# Patient Record
Sex: MALE | Race: WHITE | NOT HISPANIC OR LATINO
[De-identification: names, ages, dates, MRNs, and addresses within clinical notes are randomized per-mention and may not be internally consistent; named-entity substitution may affect disease eponyms.]

---

## 2017-07-03 ENCOUNTER — APPOINTMENT (OUTPATIENT)
Dept: MRI IMAGING | Facility: CLINIC | Age: 72
End: 2017-07-03

## 2017-07-03 ENCOUNTER — OUTPATIENT (OUTPATIENT)
Dept: OUTPATIENT SERVICES | Facility: HOSPITAL | Age: 72
LOS: 1 days | End: 2017-07-03
Payer: COMMERCIAL

## 2017-07-03 DIAGNOSIS — Z00.8 ENCOUNTER FOR OTHER GENERAL EXAMINATION: ICD-10-CM

## 2017-07-03 DIAGNOSIS — M54.16 RADICULOPATHY, LUMBAR REGION: ICD-10-CM

## 2017-07-03 PROCEDURE — 72148 MRI LUMBAR SPINE W/O DYE: CPT

## 2019-05-01 ENCOUNTER — APPOINTMENT (OUTPATIENT)
Dept: UROLOGY | Facility: CLINIC | Age: 74
End: 2019-05-01
Payer: MEDICARE

## 2019-05-01 VITALS
BODY MASS INDEX: 30.31 KG/M2 | HEIGHT: 68 IN | HEART RATE: 62 BPM | OXYGEN SATURATION: 95 % | WEIGHT: 200 LBS | SYSTOLIC BLOOD PRESSURE: 124 MMHG | DIASTOLIC BLOOD PRESSURE: 76 MMHG | TEMPERATURE: 98 F

## 2019-05-01 DIAGNOSIS — Z80.1 FAMILY HISTORY OF MALIGNANT NEOPLASM OF TRACHEA, BRONCHUS AND LUNG: ICD-10-CM

## 2019-05-01 DIAGNOSIS — Z80.8 FAMILY HISTORY OF MALIGNANT NEOPLASM OF OTHER ORGANS OR SYSTEMS: ICD-10-CM

## 2019-05-01 DIAGNOSIS — Z78.9 OTHER SPECIFIED HEALTH STATUS: ICD-10-CM

## 2019-05-01 PROCEDURE — 99204 OFFICE O/P NEW MOD 45 MIN: CPT

## 2019-05-05 PROBLEM — Z80.1 FAMILY HISTORY OF LUNG CANCER: Status: ACTIVE | Noted: 2019-05-05

## 2019-05-05 PROBLEM — Z80.8 FAMILY HISTORY OF MALIGNANT NEOPLASM OF BONE: Status: ACTIVE | Noted: 2019-05-05

## 2019-05-05 PROBLEM — Z78.9 SOCIAL ALCOHOL USE: Status: ACTIVE | Noted: 2019-05-05

## 2019-05-05 NOTE — PHYSICAL EXAM
[General Appearance - Well Developed] : well developed [General Appearance - Well Nourished] : well nourished [Well Groomed] : well groomed [Normal Appearance] : normal appearance [Edema] : no peripheral edema [General Appearance - In No Acute Distress] : no acute distress [Respiration, Rhythm And Depth] : normal respiratory rhythm and effort [Costovertebral Angle Tenderness] : no ~M costovertebral angle tenderness [Abdomen Tenderness] : non-tender [Abdomen Soft] : soft [Exaggerated Use Of Accessory Muscles For Inspiration] : no accessory muscle use [Penis Abnormality] : normal circumcised penis [Urethral Meatus] : meatus normal [Urinary Bladder Findings] : the bladder was normal on palpation [Scrotum] : the scrotum was normal [Testes Mass (___cm)] : there were no testicular masses [No Prostate Nodules] : no prostate nodules [] : no rash [Prostate Size ___ gm] : prostate size [unfilled] gm [Normal Station and Gait] : the gait and station were normal for the patient's age [No Focal Deficits] : no focal deficits [Oriented To Time, Place, And Person] : oriented to person, place, and time [Affect] : the affect was normal [Mood] : the mood was normal [Not Anxious] : not anxious [No Palpable Adenopathy] : no palpable adenopathy

## 2019-05-05 NOTE — HISTORY OF PRESENT ILLNESS
[FreeTextEntry1] : Presents to office for evaluation.\par BPH: currently on dutasteride and tamsulosin. Has been on medication for several years.\par In the last 12 months, increasing symptoms including weak stream, straining void, urgency, nocturia 6-7 times.  Complaints of retrograde ejaculation secondary to medication as well.  Denies hematuria, recurrent UTI.  Has occasional urge incontinence.  No prior  surgery.  No h/o kidney stones.  \par \par Symptoms are bothersome.\par ROS otherwise unremarkable.

## 2019-05-05 NOTE — ASSESSMENT
[FreeTextEntry1] : BPH: continue finasteride and tamsulosin.\par Urinary frequency: start oxybutynin ER 10 mg once daily.\par Goals of medication reviewed.  Discussed the potential adverse effects of the medication.  Discussed the proper administration of the medication.\par \par Follow up in 6 weeks at [place] office for cystoscopy to evaluate bladder outlet.  If significant obstruction, then may need to consider outlet reducing procedure.\par \par All questions answered.

## 2019-06-11 ENCOUNTER — APPOINTMENT (OUTPATIENT)
Dept: UROLOGY | Facility: CLINIC | Age: 74
End: 2019-06-11
Payer: MEDICARE

## 2019-06-11 ENCOUNTER — OUTPATIENT (OUTPATIENT)
Dept: OUTPATIENT SERVICES | Facility: HOSPITAL | Age: 74
LOS: 1 days | End: 2019-06-11
Payer: COMMERCIAL

## 2019-06-11 DIAGNOSIS — R35.0 FREQUENCY OF MICTURITION: ICD-10-CM

## 2019-06-11 PROCEDURE — 52000 CYSTOURETHROSCOPY: CPT

## 2019-06-26 DIAGNOSIS — N40.1 BENIGN PROSTATIC HYPERPLASIA WITH LOWER URINARY TRACT SYMPTOMS: ICD-10-CM

## 2019-07-23 ENCOUNTER — APPOINTMENT (OUTPATIENT)
Dept: UROLOGY | Facility: CLINIC | Age: 74
End: 2019-07-23
Payer: MEDICARE

## 2019-07-23 PROCEDURE — 99213 OFFICE O/P EST LOW 20 MIN: CPT

## 2019-07-23 RX ORDER — DUTASTERIDE 0.5 MG/1
0.5 CAPSULE, LIQUID FILLED ORAL
Refills: 0 | Status: COMPLETED | COMMUNITY
End: 2019-07-23

## 2019-07-23 RX ORDER — TAMSULOSIN HYDROCHLORIDE 0.4 MG/1
0.4 CAPSULE ORAL
Refills: 0 | Status: COMPLETED | COMMUNITY
End: 2019-07-23

## 2019-10-29 ENCOUNTER — APPOINTMENT (OUTPATIENT)
Dept: UROLOGY | Facility: CLINIC | Age: 74
End: 2019-10-29
Payer: MEDICARE

## 2019-10-29 VITALS — SYSTOLIC BLOOD PRESSURE: 126 MMHG | DIASTOLIC BLOOD PRESSURE: 77 MMHG | HEART RATE: 56 BPM | TEMPERATURE: 98 F

## 2019-10-29 PROCEDURE — 99213 OFFICE O/P EST LOW 20 MIN: CPT

## 2019-10-29 NOTE — HISTORY OF PRESENT ILLNESS
[FreeTextEntry1] : Here for 3-month follow-up visit.  He has BPH and lower urinary tract symptoms.  Currently on alfuzocin and oxybutynin ER.  His dose was increased to 15 mg once a day and he noticed an improvement in his urinary frequency.  Nocturia now 2 times per night.  Urinary flow is good.  He no hematuria or dysuria.  No abdominal pain or flank pain.  He denies urge incontinence.\par \par Tolerating medication well without significant side effects.

## 2019-10-29 NOTE — ASSESSMENT
[FreeTextEntry1] : Recent PSA 0.25 ng/mL\par Continue current medications and follow-up in 6 months.

## 2020-04-07 ENCOUNTER — APPOINTMENT (OUTPATIENT)
Dept: UROLOGY | Facility: CLINIC | Age: 75
End: 2020-04-07

## 2021-12-16 ENCOUNTER — APPOINTMENT (OUTPATIENT)
Dept: GASTROENTEROLOGY | Facility: CLINIC | Age: 76
End: 2021-12-16
Payer: MEDICARE

## 2021-12-16 VITALS
WEIGHT: 192 LBS | BODY MASS INDEX: 29.1 KG/M2 | DIASTOLIC BLOOD PRESSURE: 90 MMHG | TEMPERATURE: 98 F | HEIGHT: 68 IN | SYSTOLIC BLOOD PRESSURE: 138 MMHG

## 2021-12-16 DIAGNOSIS — Z12.11 ENCOUNTER FOR SCREENING FOR MALIGNANT NEOPLASM OF COLON: ICD-10-CM

## 2021-12-16 PROCEDURE — 99204 OFFICE O/P NEW MOD 45 MIN: CPT

## 2021-12-16 NOTE — CONSULT LETTER
[Dear  ___] : Dear  [unfilled], [Consult Letter:] : I had the pleasure of evaluating your patient, [unfilled]. [Please see my note below.] : Please see my note below. [Consult Closing:] : Thank you very much for allowing me to participate in the care of this patient.  If you have any questions, please do not hesitate to contact me. [Sincerely,] : Sincerely, [FreeTextEntry2] : Moe Augustine MD\par \par [address] [facility]\par [facility] [facility] \par \par  [FreeTextEntry3] : Michael June MD\par

## 2021-12-16 NOTE — ASSESSMENT
[FreeTextEntry1] : Impression\par \par Heartburn\par \par Recently placed on Pepcid\par \par Chronic renal insufficiency and taken off pantoprazole\par \par Request for colon cancer screening\par \par Suggest\par \par Agree with colonoscopy and upper endoscopy\par \par Nulytely\par \par The laxative, or its risks benefits and alternatives have been thoroughly reviewed with the patient in great detail. The laxative instructions have been reviewed in great detail with the patient.\par \par Risks/benefits:\par The procedure, the risks and benefits and alternatives have been reviewed in great detail with the patient.  Risks including, but not limited to sedation such as cardiac and pulmonary compromise, the procedure itself such as bleeding requiring hospitalization, transfusion, surgery, temporary or permanent colostomy.  Perforation or puncture of the requiring hospitalization, surgery, temporary colostomy.\par It has been explained to the patient that though colonoscopy is thought to be the best screening exam for colon cancer and polyps, no screening exam can find all colon polyps or cancers.  \par The patient expresses understanding of the procedure and consents to undergoing the procedure.\par

## 2021-12-16 NOTE — PHYSICAL EXAM
[General Appearance - Alert] : alert [General Appearance - In No Acute Distress] : in no acute distress [General Appearance - Well Developed] : well developed [General Appearance - Well Nourished] : well nourished [Sclera] : the sclera and conjunctiva were normal [Neck Appearance] : the appearance of the neck was normal [Neck Cervical Mass (___cm)] : no neck mass was observed [Jugular Venous Distention Increased] : there was no jugular-venous distention [Auscultation Breath Sounds / Voice Sounds] : lungs were clear to auscultation bilaterally [Apical Impulse] : the apical impulse was normal [Full Pulse] : the pedal pulses are present [Edema] : there was no peripheral edema [Bowel Sounds] : normal bowel sounds [Abdomen Soft] : soft [Abdomen Tenderness] : non-tender [] : no hepato-splenomegaly [Abdomen Mass (___ Cm)] : no abdominal mass palpated [Occult Blood Positive] : stool was negative for occult blood [Cervical Lymph Nodes Enlarged Posterior Bilaterally] : posterior cervical [Cervical Lymph Nodes Enlarged Anterior Bilaterally] : anterior cervical [Supraclavicular Lymph Nodes Enlarged Bilaterally] : supraclavicular [Axillary Lymph Nodes Enlarged Bilaterally] : axillary [Femoral Lymph Nodes Enlarged Bilaterally] : femoral [Inguinal Lymph Nodes Enlarged Bilaterally] : inguinal [No CVA Tenderness] : no ~M costovertebral angle tenderness [No Spinal Tenderness] : no spinal tenderness [Abnormal Walk] : normal gait [Nail Clubbing] : no clubbing  or cyanosis of the fingernails [Musculoskeletal - Swelling] : no joint swelling seen [Motor Tone] : muscle strength and tone were normal [Skin Color & Pigmentation] : normal skin color and pigmentation [Skin Turgor] : normal skin turgor [No Focal Deficits] : no focal deficits [Oriented To Time, Place, And Person] : oriented to person, place, and time [Impaired Insight] : insight and judgment were intact [Affect] : the affect was normal

## 2021-12-16 NOTE — HISTORY OF PRESENT ILLNESS
[de-identified] : Moe Augustine MD\par \par [address] [facility]\[place] [address] \par \par 76-year-old gentleman\par \par Chronic gastroesophageal reflux disease\par \par He had been on pantoprazole a number of years\par \par He has had some decreased renal function\par \par He was taken off pantoprazole\par \par He was placed on Pepcid\par \par He took his first Pepcid today\par \par There is no heartburn or dysphagia so far\par \par No odynophagia\par \par His appetite is good and is not losing any weight and he has no abdominal pain\par \par He is due for screening colonoscopy\par \par There is no change in bowel habits or blood per rectum\par \par He has had a polyp in the past

## 2021-12-16 NOTE — REASON FOR VISIT
[Initial Evaluation] : an initial evaluation [FreeTextEntry1] : GERD, chronic renal insufficiency, taken off pantoprazole and placed on Pepcid, has an upper endoscopy, needs colonoscopy, history of colon polyp

## 2021-12-17 ENCOUNTER — APPOINTMENT (OUTPATIENT)
Dept: GASTROENTEROLOGY | Facility: CLINIC | Age: 76
End: 2021-12-17

## 2022-02-03 LAB — SARS-COV-2 N GENE NPH QL NAA+PROBE: NOT DETECTED

## 2022-02-07 ENCOUNTER — APPOINTMENT (OUTPATIENT)
Dept: GASTROENTEROLOGY | Facility: AMBULATORY MEDICAL SERVICES | Age: 77
End: 2022-02-07
Payer: MEDICARE

## 2022-02-07 PROCEDURE — 43239 EGD BIOPSY SINGLE/MULTIPLE: CPT

## 2022-02-07 PROCEDURE — 45380 COLONOSCOPY AND BIOPSY: CPT

## 2022-02-07 RX ORDER — PANTOPRAZOLE 20 MG/1
20 TABLET, DELAYED RELEASE ORAL
Refills: 0 | Status: DISCONTINUED | COMMUNITY
End: 2022-02-07

## 2022-02-07 RX ORDER — POLYETHYLENE GLYCOL 3350, SODIUM CHLORIDE, SODIUM BICARBONATE AND POTASSIUM CHLORIDE WITH LEMON FLAVOR 420; 11.2; 5.72; 1.48 G/4L; G/4L; G/4L; G/4L
420 POWDER, FOR SOLUTION ORAL
Qty: 1 | Refills: 0 | Status: DISCONTINUED | COMMUNITY
Start: 2021-12-16 | End: 2022-02-07

## 2022-02-15 ENCOUNTER — NON-APPOINTMENT (OUTPATIENT)
Age: 77
End: 2022-02-15

## 2022-05-10 LAB — SARS-COV-2 N GENE NPH QL NAA+PROBE: NOT DETECTED

## 2022-05-12 ENCOUNTER — APPOINTMENT (OUTPATIENT)
Dept: GASTROENTEROLOGY | Facility: AMBULATORY MEDICAL SERVICES | Age: 77
End: 2022-05-12
Payer: MEDICARE

## 2022-05-12 PROCEDURE — 43239 EGD BIOPSY SINGLE/MULTIPLE: CPT

## 2022-05-18 LAB — GASTRIN SERPL-MCNC: 27 PG/ML

## 2022-05-20 ENCOUNTER — NON-APPOINTMENT (OUTPATIENT)
Age: 77
End: 2022-05-20

## 2022-05-20 LAB
GASTRIN SERPL-MCNC: 19 PG/ML
GASTRIN SERPL-MCNC: 28 PG/ML

## 2022-05-24 ENCOUNTER — NON-APPOINTMENT (OUTPATIENT)
Age: 77
End: 2022-05-24

## 2022-08-10 ENCOUNTER — RX RENEWAL (OUTPATIENT)
Age: 77
End: 2022-08-10

## 2022-08-10 ENCOUNTER — NON-APPOINTMENT (OUTPATIENT)
Age: 77
End: 2022-08-10

## 2022-08-20 LAB — SARS-COV-2 N GENE NPH QL NAA+PROBE: NOT DETECTED

## 2022-08-24 ENCOUNTER — APPOINTMENT (OUTPATIENT)
Dept: GASTROENTEROLOGY | Facility: AMBULATORY MEDICAL SERVICES | Age: 77
End: 2022-08-24

## 2022-08-24 PROCEDURE — 43239 EGD BIOPSY SINGLE/MULTIPLE: CPT

## 2022-08-29 ENCOUNTER — NON-APPOINTMENT (OUTPATIENT)
Age: 77
End: 2022-08-29

## 2022-11-09 ENCOUNTER — APPOINTMENT (OUTPATIENT)
Dept: INTERNAL MEDICINE | Facility: CLINIC | Age: 77
End: 2022-11-09

## 2022-11-09 VITALS
SYSTOLIC BLOOD PRESSURE: 130 MMHG | DIASTOLIC BLOOD PRESSURE: 78 MMHG | OXYGEN SATURATION: 97 % | BODY MASS INDEX: 30.01 KG/M2 | HEART RATE: 73 BPM | HEIGHT: 68 IN | TEMPERATURE: 98 F | WEIGHT: 198 LBS

## 2022-11-09 DIAGNOSIS — K29.00 ACUTE GASTRITIS W/OUT BLEEDING: ICD-10-CM

## 2022-11-09 DIAGNOSIS — R41.3 OTHER AMNESIA: ICD-10-CM

## 2022-11-09 DIAGNOSIS — Z87.898 PERSONAL HISTORY OF OTHER SPECIFIED CONDITIONS: ICD-10-CM

## 2022-11-09 DIAGNOSIS — Z01.812 ENCOUNTER FOR PREPROCEDURAL LABORATORY EXAMINATION: ICD-10-CM

## 2022-11-09 DIAGNOSIS — Z20.822 ENCOUNTER FOR PREPROCEDURAL LABORATORY EXAMINATION: ICD-10-CM

## 2022-11-09 PROCEDURE — G0439: CPT

## 2022-11-09 RX ORDER — METOPROLOL SUCCINATE 25 MG/1
25 TABLET, EXTENDED RELEASE ORAL
Refills: 0 | Status: DISCONTINUED | COMMUNITY
End: 2022-11-09

## 2022-11-09 RX ORDER — OXYBUTYNIN CHLORIDE 15 MG/1
15 TABLET, EXTENDED RELEASE ORAL DAILY
Qty: 90 | Refills: 2 | Status: DISCONTINUED | COMMUNITY
Start: 2019-05-01 | End: 2022-11-09

## 2022-11-09 RX ORDER — ALFUZOSIN HYDROCHLORIDE 10 MG/1
10 TABLET, EXTENDED RELEASE ORAL
Qty: 180 | Refills: 3 | Status: DISCONTINUED | COMMUNITY
Start: 2019-06-11 | End: 2022-11-09

## 2022-12-04 NOTE — HEALTH RISK ASSESSMENT
[Never] : Never [Yes] : Yes [No] : In the past 12 months have you used drugs other than those required for medical reasons? No [No falls in past year] : Patient reported no falls in the past year [0] : 2) Feeling down, depressed, or hopeless: Not at all (0) [PHQ-2 Negative - No further assessment needed] : PHQ-2 Negative - No further assessment needed [[id number]] : 0 [Patient reported colonoscopy was normal] : Patient reported colonoscopy was normal [Change in mental status noted] : No change in mental status noted [None] : None [Alone] : lives alone [Retired] : retired [Single] : single [] :  [Sexually Active] : sexually active [High Risk Behavior] : no high risk behavior [Feels Safe at Home] : Feels safe at home [Fully functional (bathing, dressing, toileting, transferring, walking, feeding)] : Fully functional (bathing, dressing, toileting, transferring, walking, feeding) [Fully functional (using the telephone, shopping, preparing meals, housekeeping, doing laundry, using] : Fully functional and needs no help or supervision to perform IADLs (using the telephone, shopping, preparing meals, housekeeping, doing laundry, using transportation, managing medications and managing finances) [Reports changes in hearing] : Reports no changes in hearing [Reports changes in vision] : Reports no changes in vision [Reports changes in dental health] : Reports no changes in dental health [ColonoscopyDate] : 02/2022

## 2022-12-04 NOTE — PHYSICAL EXAM
[No Acute Distress] : no acute distress [Well Nourished] : well nourished [Well Developed] : well developed [Well-Appearing] : well-appearing [Normal Sclera/Conjunctiva] : normal sclera/conjunctiva [PERRL] : pupils equal round and reactive to light [EOMI] : extraocular movements intact [Normal Outer Ear/Nose] : the outer ears and nose were normal in appearance [Normal Oropharynx] : the oropharynx was normal [Normal TMs] : both tympanic membranes were normal [Normal Nasal Mucosa] : the nasal mucosa was normal [No JVD] : no jugular venous distention [No Lymphadenopathy] : no lymphadenopathy [Supple] : supple [Thyroid Normal, No Nodules] : the thyroid was normal and there were no nodules present [No Respiratory Distress] : no respiratory distress  [No Accessory Muscle Use] : no accessory muscle use [Clear to Auscultation] : lungs were clear to auscultation bilaterally [Normal Rate] : normal rate  [Regular Rhythm] : with a regular rhythm [Normal S1, S2] : normal S1 and S2 [No Murmur] : no murmur heard [No Carotid Bruits] : no carotid bruits [No Abdominal Bruit] : a ~M bruit was not heard ~T in the abdomen [No Varicosities] : no varicosities [Pedal Pulses Present] : the pedal pulses are present [No Palpable Aorta] : no palpable aorta [No Extremity Clubbing/Cyanosis] : no extremity clubbing/cyanosis [Soft] : abdomen soft [Non Tender] : non-tender [Non-distended] : non-distended [No Masses] : no abdominal mass palpated [No HSM] : no HSM [Normal Bowel Sounds] : normal bowel sounds [Normal Posterior Cervical Nodes] : no posterior cervical lymphadenopathy [Normal Anterior Cervical Nodes] : no anterior cervical lymphadenopathy [No CVA Tenderness] : no CVA  tenderness [No Spinal Tenderness] : no spinal tenderness [No Joint Swelling] : no joint swelling [Grossly Normal Strength/Tone] : grossly normal strength/tone [No Rash] : no rash [Coordination Grossly Intact] : coordination grossly intact [No Focal Deficits] : no focal deficits [Normal Gait] : normal gait [Deep Tendon Reflexes (DTR)] : deep tendon reflexes were 2+ and symmetric [Speech Grossly Normal] : speech grossly normal [Memory Grossly Normal] : memory grossly normal [Normal Affect] : the affect was normal [Alert and Oriented x3] : oriented to person, place, and time [Normal Mood] : the mood was normal [Normal Insight/Judgement] : insight and judgment were intact [de-identified] : +b/L trace edema

## 2022-12-04 NOTE — HISTORY OF PRESENT ILLNESS
[de-identified] : 76 y/o male patient is new to the office presents today to establish care with new PCP/AWV:\par - HCM: last colonoscopy 02/2022 \par - f/u HTN/peripheral edema/CRI/obesity - BP well controlled, denies any CP/SOB/Palpitations/edema, compliant with medications, following with nephrologist, labs done yesterday will have records sent to office

## 2022-12-09 ENCOUNTER — APPOINTMENT (OUTPATIENT)
Dept: GASTROENTEROLOGY | Facility: CLINIC | Age: 77
End: 2022-12-09

## 2022-12-09 VITALS
HEART RATE: 65 BPM | OXYGEN SATURATION: 97 % | BODY MASS INDEX: 29.89 KG/M2 | HEIGHT: 68 IN | WEIGHT: 197.2 LBS | TEMPERATURE: 97.7 F | SYSTOLIC BLOOD PRESSURE: 120 MMHG | DIASTOLIC BLOOD PRESSURE: 72 MMHG

## 2022-12-09 DIAGNOSIS — K29.50 UNSPECIFIED CHRONIC GASTRITIS W/OUT BLEEDING: ICD-10-CM

## 2022-12-09 DIAGNOSIS — Z86.010 PERSONAL HISTORY OF COLONIC POLYPS: ICD-10-CM

## 2022-12-09 PROCEDURE — 99213 OFFICE O/P EST LOW 20 MIN: CPT

## 2022-12-09 NOTE — HISTORY OF PRESENT ILLNESS
[FreeTextEntry1] : Dr. Rosado takes care this pleasant 77-year-old gentleman\par \par Previously had GERD symptoms\par \par Upper endoscopy initially showed severe gastritis with a linear erosion with negative biopsies\par \par Repeat showed again severe gastritis\par \par Gastrin levels were normal\par \par Most recent upper endoscopy on Carafate and famotidine much improved gastritis with again negative biopsies\par \par No H. pylori\par \par Appetite is good\par \par

## 2022-12-09 NOTE — PHYSICAL EXAM
[Alert] : alert [Normal Voice/Communication] : normal voice/communication [Healthy Appearing] : healthy appearing [No Acute Distress] : no acute distress [Sclera] : the sclera and conjunctiva were normal [Hearing Threshold Finger Rub Not [place]] : hearing was normal [Normal Appearance] : the appearance of the neck was normal [No Respiratory Distress] : no respiratory distress [No Acc Muscle Use] : no accessory muscle use [Respiration, Rhythm And Depth] : normal respiratory rhythm and effort [Heart Rate And Rhythm] : heart rate was normal and rhythm regular [None] : no edema [Bowel Sounds] : normal bowel sounds [Abdomen Tenderness] : non-tender [No Masses] : no abdominal mass palpated [Abdomen Soft] : soft [] : no hepatosplenomegaly [Cervical Lymph Nodes Enlarged Posterior Bilaterally] : no posterior cervical lymphadenopathy [No CVA Tenderness] : no CVA  tenderness [No Spinal Tenderness] : no spinal tenderness [Abnormal Walk] : normal gait [Normal Color / Pigmentation] : normal skin color and pigmentation [No Focal Deficits] : no focal deficits [Oriented To Time, Place, And Person] : oriented to person, place, and time [de-identified] : Pleasant heavyset gentleman, no acute distress

## 2022-12-09 NOTE — ASSESSMENT
[FreeTextEntry1] : Impression\par \par GERD\par \par Previous severe gastritis\par \par Most recent upper endoscopy mild gastritis\par \par All negative biopsies\par \par Gastrin levels normal\par \par Chronic renal insufficiency, nephrologist is okay with him using Pepcid, not pantoprazole\par \par Suggest\par \par Stop Carafate\par \par Continue Pepcid 40 mg a day and even tried every other day\par \par Antireflux diet and weight loss\par \par Follow-up in 1 year\par \par Call or return anytime sooner as needed

## 2022-12-09 NOTE — REASON FOR VISIT
[Follow-up] : a follow-up of an existing diagnosis [FreeTextEntry1] : GERD, gastritis, negative biopsies, normal gastrin levels, doing well

## 2022-12-17 ENCOUNTER — RX RENEWAL (OUTPATIENT)
Age: 77
End: 2022-12-17

## 2023-03-23 ENCOUNTER — NON-APPOINTMENT (OUTPATIENT)
Age: 78
End: 2023-03-23

## 2023-03-23 ENCOUNTER — APPOINTMENT (OUTPATIENT)
Dept: INTERNAL MEDICINE | Facility: CLINIC | Age: 78
End: 2023-03-23
Payer: MEDICARE

## 2023-03-23 VITALS
SYSTOLIC BLOOD PRESSURE: 133 MMHG | TEMPERATURE: 98.2 F | DIASTOLIC BLOOD PRESSURE: 74 MMHG | WEIGHT: 204 LBS | HEIGHT: 68 IN | HEART RATE: 75 BPM | BODY MASS INDEX: 30.92 KG/M2 | OXYGEN SATURATION: 96 %

## 2023-03-23 DIAGNOSIS — K21.9 GASTRO-ESOPHAGEAL REFLUX DISEASE W/OUT ESOPHAGITIS: ICD-10-CM

## 2023-03-23 PROCEDURE — 99214 OFFICE O/P EST MOD 30 MIN: CPT | Mod: 25

## 2023-03-23 PROCEDURE — 93000 ELECTROCARDIOGRAM COMPLETE: CPT

## 2023-03-23 RX ORDER — SUCRALFATE 1 G/1
1 TABLET ORAL
Qty: 120 | Refills: 3 | Status: DISCONTINUED | COMMUNITY
Start: 2022-02-07 | End: 2023-03-23

## 2023-03-23 NOTE — HISTORY OF PRESENT ILLNESS
[No Pertinent Cardiac History] : no history of aortic stenosis, atrial fibrillation, coronary artery disease, recent myocardial infarction, or implantable device/pacemaker [No Pertinent Pulmonary History] : no history of asthma, COPD, sleep apnea, or smoking [No Adverse Anesthesia Reaction] : no adverse anesthesia reaction in self or family member [(Patient denies any chest pain, claudication, dyspnea on exertion, orthopnea, palpitations or syncope)] : Patient denies any chest pain, claudication, dyspnea on exertion, orthopnea, palpitations or syncope [Chronic Anticoagulation] : no chronic anticoagulation [Chronic Kidney Disease] : no chronic kidney disease [Diabetes] : no diabetes [FreeTextEntry1] : 4/12/23 [FreeTextEntry2] : 4/12/23 [FreeTextEntry3] : Dr. Silas Christianson [FreeTextEntry4] : 77 year old male here for clearance for cataract surgery for left eye.

## 2023-03-23 NOTE — ASSESSMENT
[Patient Optimized for Surgery] : Patient optimized for surgery [No Further Testing Recommended] : no further testing recommended [Modify medications prior to procedure] : Modify medications prior to procedure [FreeTextEntry7] : discuss about holding Flomax

## 2023-05-08 ENCOUNTER — APPOINTMENT (OUTPATIENT)
Dept: INTERNAL MEDICINE | Facility: CLINIC | Age: 78
End: 2023-05-08
Payer: MEDICARE

## 2023-05-08 ENCOUNTER — LABORATORY RESULT (OUTPATIENT)
Age: 78
End: 2023-05-08

## 2023-05-08 VITALS
HEIGHT: 68 IN | WEIGHT: 200 LBS | BODY MASS INDEX: 30.31 KG/M2 | HEART RATE: 71 BPM | TEMPERATURE: 98 F | DIASTOLIC BLOOD PRESSURE: 82 MMHG | SYSTOLIC BLOOD PRESSURE: 142 MMHG | OXYGEN SATURATION: 96 %

## 2023-05-08 DIAGNOSIS — H26.9 UNSPECIFIED CATARACT: ICD-10-CM

## 2023-05-08 DIAGNOSIS — N18.9 CHRONIC KIDNEY DISEASE, UNSPECIFIED: ICD-10-CM

## 2023-05-08 DIAGNOSIS — Z01.818 ENCOUNTER FOR OTHER PREPROCEDURAL EXAMINATION: ICD-10-CM

## 2023-05-08 PROCEDURE — 36415 COLL VENOUS BLD VENIPUNCTURE: CPT

## 2023-05-08 PROCEDURE — 99214 OFFICE O/P EST MOD 30 MIN: CPT | Mod: 25

## 2023-05-08 RX ORDER — FAMOTIDINE 40 MG/1
40 TABLET, FILM COATED ORAL DAILY
Qty: 30 | Refills: 5 | Status: DISCONTINUED | OUTPATIENT
Start: 2022-02-07 | End: 2023-05-08

## 2023-05-08 RX ORDER — CALCITRIOL 0.5 UG/1
0.5 CAPSULE, LIQUID FILLED ORAL
Refills: 0 | Status: ACTIVE | COMMUNITY

## 2023-05-08 NOTE — HISTORY OF PRESENT ILLNESS
[de-identified] : 78 y/o male patient presents today:\par - f/u HTN/CKD stage 3/Peripheral edema/obesity - following with nephrologist, recently labs done 4/13 reviewed with patient today, CKD stable, no recent changes to medications \par - c/o productive cough, SOB, wheezing, nasal congestion, sore throat, x 1 week, had Covid test done 5/3 negative, has been taking Mucinex D and Afrin with minimal relief

## 2023-05-08 NOTE — PHYSICAL EXAM
[Normal Oropharynx] : the oropharynx was normal [Normal] : no rash [de-identified] :  hypertrophied turbinates, clear rhinorrhea, PND, no exudates [de-identified] : +mild exp wheezing B/L, scattered rhonchi  [de-identified] : +trace peripheral edema

## 2023-05-08 NOTE — REVIEW OF SYSTEMS
[Fever] : no fever [Chills] : no chills [Fatigue] : fatigue [Earache] : no earache [Nosebleeds] : no nosebleeds [Postnasal Drip] : postnasal drip [Nasal Discharge] : nasal discharge [Sore Throat] : sore throat [Hoarseness] : no hoarseness [Shortness Of Breath] : shortness of breath [Wheezing] : wheezing [Cough] : cough [Dyspnea on Exertion] : not dyspnea on exertion [Negative] : Psychiatric

## 2023-06-01 ENCOUNTER — APPOINTMENT (OUTPATIENT)
Dept: INTERNAL MEDICINE | Facility: CLINIC | Age: 78
End: 2023-06-01
Payer: MEDICARE

## 2023-06-01 ENCOUNTER — APPOINTMENT (OUTPATIENT)
Dept: RADIOLOGY | Facility: CLINIC | Age: 78
End: 2023-06-01
Payer: MEDICARE

## 2023-06-01 VITALS
HEIGHT: 68 IN | HEART RATE: 62 BPM | BODY MASS INDEX: 30.31 KG/M2 | SYSTOLIC BLOOD PRESSURE: 144 MMHG | TEMPERATURE: 98.1 F | OXYGEN SATURATION: 95 % | WEIGHT: 200 LBS | DIASTOLIC BLOOD PRESSURE: 81 MMHG

## 2023-06-01 PROCEDURE — 99214 OFFICE O/P EST MOD 30 MIN: CPT

## 2023-06-01 PROCEDURE — 71046 X-RAY EXAM CHEST 2 VIEWS: CPT

## 2023-06-01 NOTE — PHYSICAL EXAM
[No Acute Distress] : no acute distress [Well Nourished] : well nourished [Well Developed] : well developed [Well-Appearing] : well-appearing [Normal Sclera/Conjunctiva] : normal sclera/conjunctiva [PERRL] : pupils equal round and reactive to light [EOMI] : extraocular movements intact [Normal Outer Ear/Nose] : the outer ears and nose were normal in appearance [Normal Oropharynx] : the oropharynx was normal [No JVD] : no jugular venous distention [No Lymphadenopathy] : no lymphadenopathy [Supple] : supple [Thyroid Normal, No Nodules] : the thyroid was normal and there were no nodules present [No Respiratory Distress] : no respiratory distress  [No Accessory Muscle Use] : no accessory muscle use [Normal Rate] : normal rate  [Regular Rhythm] : with a regular rhythm [Normal S1, S2] : normal S1 and S2 [No Murmur] : no murmur heard [No Carotid Bruits] : no carotid bruits [No Abdominal Bruit] : a ~M bruit was not heard ~T in the abdomen [No Varicosities] : no varicosities [Pedal Pulses Present] : the pedal pulses are present [No Edema] : there was no peripheral edema [No Palpable Aorta] : no palpable aorta [No Extremity Clubbing/Cyanosis] : no extremity clubbing/cyanosis [Soft] : abdomen soft [Non Tender] : non-tender [Non-distended] : non-distended [No Masses] : no abdominal mass palpated [No HSM] : no HSM [Normal Bowel Sounds] : normal bowel sounds [Normal Posterior Cervical Nodes] : no posterior cervical lymphadenopathy [Normal Anterior Cervical Nodes] : no anterior cervical lymphadenopathy [No CVA Tenderness] : no CVA  tenderness [No Spinal Tenderness] : no spinal tenderness [No Joint Swelling] : no joint swelling [Grossly Normal Strength/Tone] : grossly normal strength/tone [No Rash] : no rash [Coordination Grossly Intact] : coordination grossly intact [No Focal Deficits] : no focal deficits [Normal Gait] : normal gait [Deep Tendon Reflexes (DTR)] : deep tendon reflexes were 2+ and symmetric [Normal Affect] : the affect was normal [Normal Insight/Judgement] : insight and judgment were intact [de-identified] : b/l wheezing

## 2023-07-05 ENCOUNTER — RX RENEWAL (OUTPATIENT)
Age: 78
End: 2023-07-05

## 2023-07-11 ENCOUNTER — APPOINTMENT (OUTPATIENT)
Dept: INTERNAL MEDICINE | Facility: CLINIC | Age: 78
End: 2023-07-11
Payer: MEDICARE

## 2023-07-11 VITALS
WEIGHT: 200 LBS | HEIGHT: 68 IN | SYSTOLIC BLOOD PRESSURE: 152 MMHG | TEMPERATURE: 98.1 F | DIASTOLIC BLOOD PRESSURE: 81 MMHG | BODY MASS INDEX: 30.31 KG/M2 | OXYGEN SATURATION: 95 % | HEART RATE: 79 BPM

## 2023-07-11 DIAGNOSIS — J20.9 ACUTE BRONCHITIS, UNSPECIFIED: ICD-10-CM

## 2023-07-11 PROCEDURE — 99214 OFFICE O/P EST MOD 30 MIN: CPT

## 2023-07-11 NOTE — HISTORY OF PRESENT ILLNESS
[FreeTextEntry8] : 77 year old male here for cough, chest congestion 4-5 days. Patient states wheezing, worse in the morning. no fever no chills. Patient states worse when laying down, productive cough. covid negative.

## 2023-07-11 NOTE — PHYSICAL EXAM
[No Acute Distress] : no acute distress [Well Nourished] : well nourished [Well Developed] : well developed [Well-Appearing] : well-appearing [Normal Sclera/Conjunctiva] : normal sclera/conjunctiva [PERRL] : pupils equal round and reactive to light [EOMI] : extraocular movements intact [Normal Outer Ear/Nose] : the outer ears and nose were normal in appearance [Normal Oropharynx] : the oropharynx was normal [No JVD] : no jugular venous distention [No Lymphadenopathy] : no lymphadenopathy [Supple] : supple [Thyroid Normal, No Nodules] : the thyroid was normal and there were no nodules present [No Respiratory Distress] : no respiratory distress  [Clear to Auscultation] : lungs were clear to auscultation bilaterally [Normal Rate] : normal rate  [Regular Rhythm] : with a regular rhythm [Normal S1, S2] : normal S1 and S2 [No Murmur] : no murmur heard [No Carotid Bruits] : no carotid bruits [No Abdominal Bruit] : a ~M bruit was not heard ~T in the abdomen [No Varicosities] : no varicosities [Pedal Pulses Present] : the pedal pulses are present [No Edema] : there was no peripheral edema [No Palpable Aorta] : no palpable aorta [No Extremity Clubbing/Cyanosis] : no extremity clubbing/cyanosis [Soft] : abdomen soft [Non Tender] : non-tender [Non-distended] : non-distended [No Masses] : no abdominal mass palpated [No HSM] : no HSM [Normal Bowel Sounds] : normal bowel sounds [Normal Posterior Cervical Nodes] : no posterior cervical lymphadenopathy [Normal Anterior Cervical Nodes] : no anterior cervical lymphadenopathy [No CVA Tenderness] : no CVA  tenderness [No Spinal Tenderness] : no spinal tenderness [No Joint Swelling] : no joint swelling [Grossly Normal Strength/Tone] : grossly normal strength/tone [No Rash] : no rash [Coordination Grossly Intact] : coordination grossly intact [No Focal Deficits] : no focal deficits [Normal Gait] : normal gait [Deep Tendon Reflexes (DTR)] : deep tendon reflexes were 2+ and symmetric [Normal Affect] : the affect was normal [Normal Insight/Judgement] : insight and judgment were intact

## 2023-09-12 LAB
25(OH)D3 SERPL-MCNC: 53.9 NG/ML
BASOPHILS # BLD AUTO: 0.06 K/UL
BASOPHILS NFR BLD AUTO: 0.8 %
CHOLEST SERPL-MCNC: 205 MG/DL
EOSINOPHIL # BLD AUTO: 0.67 K/UL
EOSINOPHIL NFR BLD AUTO: 8.5 %
ESTIMATED AVERAGE GLUCOSE: 114 MG/DL
HBA1C MFR BLD HPLC: 5.6 %
HCT VFR BLD CALC: 35.6 %
HDLC SERPL-MCNC: 56 MG/DL
HGB BLD-MCNC: 12.6 G/DL
IMM GRANULOCYTES NFR BLD AUTO: 0.9 %
LDLC SERPL CALC-MCNC: 133 MG/DL
LYMPHOCYTES # BLD AUTO: 1.19 K/UL
LYMPHOCYTES NFR BLD AUTO: 15.1 %
MAN DIFF?: NORMAL
MCHC RBC-ENTMCNC: 35.4 GM/DL
MCHC RBC-ENTMCNC: 37.4 PG
MCV RBC AUTO: 105.6 FL
MONOCYTES # BLD AUTO: 0.81 K/UL
MONOCYTES NFR BLD AUTO: 10.3 %
NEUTROPHILS # BLD AUTO: 5.06 K/UL
NEUTROPHILS NFR BLD AUTO: 64.4 %
NONHDLC SERPL-MCNC: 149 MG/DL
PLATELET # BLD AUTO: 255 K/UL
PSA FREE FLD-MCNC: 59 %
PSA FREE SERPL-MCNC: 0.21 NG/ML
PSA SERPL-MCNC: 0.36 NG/ML
RBC # BLD: 3.37 M/UL
RBC # FLD: 14.1 %
TRIGL SERPL-MCNC: 79 MG/DL
VIT B12 SERPL-MCNC: 976 PG/ML
WBC # FLD AUTO: 7.86 K/UL

## 2023-09-12 RX ORDER — AZITHROMYCIN 250 MG/1
250 TABLET, FILM COATED ORAL
Qty: 1 | Refills: 0 | Status: DISCONTINUED | COMMUNITY
Start: 2023-05-08 | End: 2023-09-12

## 2023-09-12 RX ORDER — AMOXICILLIN AND CLAVULANATE POTASSIUM 875; 125 MG/1; MG/1
875-125 TABLET, COATED ORAL
Qty: 20 | Refills: 0 | Status: DISCONTINUED | COMMUNITY
Start: 2023-06-01 | End: 2023-09-12

## 2023-09-27 ENCOUNTER — APPOINTMENT (OUTPATIENT)
Dept: INTERNAL MEDICINE | Facility: CLINIC | Age: 78
End: 2023-09-27
Payer: MEDICARE

## 2023-09-27 VITALS
DIASTOLIC BLOOD PRESSURE: 77 MMHG | HEIGHT: 68 IN | SYSTOLIC BLOOD PRESSURE: 130 MMHG | TEMPERATURE: 98 F | WEIGHT: 202 LBS | HEART RATE: 65 BPM | OXYGEN SATURATION: 96 % | BODY MASS INDEX: 30.62 KG/M2

## 2023-09-27 DIAGNOSIS — J30.89 OTHER ALLERGIC RHINITIS: ICD-10-CM

## 2023-09-27 PROCEDURE — 99214 OFFICE O/P EST MOD 30 MIN: CPT

## 2023-09-27 RX ORDER — PROMETHAZINE HYDROCHLORIDE AND DEXTROMETHORPHAN HYDROBROMIDE ORAL SOLUTION 15; 6.25 MG/5ML; MG/5ML
6.25-15 SOLUTION ORAL
Qty: 300 | Refills: 0 | Status: DISCONTINUED | COMMUNITY
Start: 2023-05-08 | End: 2023-09-27

## 2023-09-27 RX ORDER — PREDNISOLONE ACETATE 10 MG/ML
1 SUSPENSION/ DROPS OPHTHALMIC
Qty: 5 | Refills: 0 | Status: DISCONTINUED | COMMUNITY
Start: 2023-05-04 | End: 2023-09-27

## 2023-09-27 RX ORDER — OFLOXACIN 3 MG/ML
0.3 SOLUTION/ DROPS OPHTHALMIC
Qty: 10 | Refills: 0 | Status: DISCONTINUED | COMMUNITY
Start: 2023-03-29 | End: 2023-09-27

## 2023-09-27 RX ORDER — PREDNISONE 20 MG/1
20 TABLET ORAL
Qty: 30 | Refills: 0 | Status: DISCONTINUED | COMMUNITY
Start: 2023-06-01 | End: 2023-09-27

## 2023-10-10 ENCOUNTER — RX RENEWAL (OUTPATIENT)
Age: 78
End: 2023-10-10

## 2023-10-17 ENCOUNTER — NON-APPOINTMENT (OUTPATIENT)
Age: 78
End: 2023-10-17

## 2023-10-17 ENCOUNTER — APPOINTMENT (OUTPATIENT)
Dept: ALLERGY | Facility: CLINIC | Age: 78
End: 2023-10-17
Payer: MEDICARE

## 2023-10-17 VITALS
SYSTOLIC BLOOD PRESSURE: 136 MMHG | HEIGHT: 68 IN | OXYGEN SATURATION: 95 % | HEART RATE: 70 BPM | DIASTOLIC BLOOD PRESSURE: 72 MMHG | BODY MASS INDEX: 31.52 KG/M2 | WEIGHT: 208 LBS

## 2023-10-17 PROCEDURE — 95004 PERQ TESTS W/ALRGNC XTRCS: CPT

## 2023-10-17 PROCEDURE — 99204 OFFICE O/P NEW MOD 45 MIN: CPT | Mod: 25

## 2023-10-25 ENCOUNTER — APPOINTMENT (OUTPATIENT)
Dept: PULMONOLOGY | Facility: CLINIC | Age: 78
End: 2023-10-25
Payer: MEDICARE

## 2023-10-25 ENCOUNTER — LABORATORY RESULT (OUTPATIENT)
Age: 78
End: 2023-10-25

## 2023-10-25 VITALS
BODY MASS INDEX: 30.46 KG/M2 | WEIGHT: 201 LBS | DIASTOLIC BLOOD PRESSURE: 81 MMHG | HEART RATE: 65 BPM | HEIGHT: 68 IN | SYSTOLIC BLOOD PRESSURE: 145 MMHG | OXYGEN SATURATION: 96 %

## 2023-10-25 PROCEDURE — 99205 OFFICE O/P NEW HI 60 MIN: CPT | Mod: 25

## 2023-10-25 PROCEDURE — 94060 EVALUATION OF WHEEZING: CPT

## 2023-10-25 PROCEDURE — 94729 DIFFUSING CAPACITY: CPT

## 2023-10-25 PROCEDURE — 94727 GAS DIL/WSHOT DETER LNG VOL: CPT

## 2023-10-25 RX ORDER — BUDESONIDE AND FORMOTEROL FUMARATE DIHYDRATE 80; 4.5 UG/1; UG/1
80-4.5 AEROSOL RESPIRATORY (INHALATION)
Qty: 1 | Refills: 2 | Status: DISCONTINUED | COMMUNITY
Start: 2023-09-27 | End: 2023-10-25

## 2023-10-25 RX ORDER — BROMFENAC SODIUM 0.7 MG/ML
0.07 SOLUTION/ DROPS OPHTHALMIC
Qty: 3 | Refills: 0 | Status: DISCONTINUED | COMMUNITY
Start: 2023-03-29 | End: 2023-10-25

## 2023-10-30 LAB
A ALTERNATA IGE QN: <0.1 KUA/L
A FUMIGATUS IGE QN: <0.1 KUA/L
BASOPHILS # BLD AUTO: 0.04 K/UL
BASOPHILS NFR BLD AUTO: 0.4 %
C ALBICANS IGE QN: <0.1 KUA/L
C HERBARUM IGE QN: <0.1 KUA/L
CAT DANDER IGE QN: <0.1 KUA/L
COMMON RAGWEED IGE QN: <0.1 KUA/L
D FARINAE IGE QN: 1.36 KUA/L
D PTERONYSS IGE QN: 0.77 KUA/L
DEPRECATED A ALTERNATA IGE RAST QL: 0
DEPRECATED A FUMIGATUS IGE RAST QL: 0
DEPRECATED C ALBICANS IGE RAST QL: 0
DEPRECATED C HERBARUM IGE RAST QL: 0
DEPRECATED CAT DANDER IGE RAST QL: 0
DEPRECATED COMMON RAGWEED IGE RAST QL: 0
DEPRECATED D FARINAE IGE RAST QL: 2
DEPRECATED D PTERONYSS IGE RAST QL: 2
DEPRECATED DOG DANDER IGE RAST QL: 0
DEPRECATED M RACEMOSUS IGE RAST QL: 0
DEPRECATED ROACH IGE RAST QL: 0
DEPRECATED TIMOTHY IGE RAST QL: 0
DEPRECATED WHITE OAK IGE RAST QL: 0
DOG DANDER IGE QN: <0.1 KUA/L
EOSINOPHIL # BLD AUTO: 0.44 K/UL
EOSINOPHIL NFR BLD AUTO: 4.1 %
HCT VFR BLD CALC: 36.4 %
HGB BLD-MCNC: 12.3 G/DL
IMM GRANULOCYTES NFR BLD AUTO: 0.6 %
LYMPHOCYTES # BLD AUTO: 1.16 K/UL
LYMPHOCYTES NFR BLD AUTO: 10.9 %
M RACEMOSUS IGE QN: <0.1 KUA/L
MAN DIFF?: NORMAL
MCHC RBC-ENTMCNC: 33.8 GM/DL
MCHC RBC-ENTMCNC: 34.7 PG
MCV RBC AUTO: 102.8 FL
MONOCYTES # BLD AUTO: 1.18 K/UL
MONOCYTES NFR BLD AUTO: 11.1 %
NEUTROPHILS # BLD AUTO: 7.77 K/UL
NEUTROPHILS NFR BLD AUTO: 72.9 %
PLATELET # BLD AUTO: 234 K/UL
RBC # BLD: 3.54 M/UL
RBC # FLD: 13.7 %
ROACH IGE QN: <0.1 KUA/L
TIMOTHY IGE QN: <0.1 KUA/L
TOTAL IGE SMQN RAST: 22 KU/L
WBC # FLD AUTO: 10.65 K/UL
WHITE OAK IGE QN: <0.1 KUA/L

## 2023-11-13 ENCOUNTER — APPOINTMENT (OUTPATIENT)
Dept: INTERNAL MEDICINE | Facility: CLINIC | Age: 78
End: 2023-11-13
Payer: MEDICARE

## 2023-11-13 ENCOUNTER — LABORATORY RESULT (OUTPATIENT)
Age: 78
End: 2023-11-13

## 2023-11-13 VITALS
OXYGEN SATURATION: 97 % | WEIGHT: 208 LBS | SYSTOLIC BLOOD PRESSURE: 143 MMHG | TEMPERATURE: 97.7 F | HEART RATE: 63 BPM | DIASTOLIC BLOOD PRESSURE: 75 MMHG | BODY MASS INDEX: 32.65 KG/M2 | HEIGHT: 67 IN

## 2023-11-13 DIAGNOSIS — Z00.00 ENCOUNTER FOR GENERAL ADULT MEDICAL EXAMINATION W/OUT ABNORMAL FINDINGS: ICD-10-CM

## 2023-11-13 DIAGNOSIS — N40.1 BENIGN PROSTATIC HYPERPLASIA WITH LOWER URINARY TRACT SYMPMS: ICD-10-CM

## 2023-11-13 DIAGNOSIS — Z23 ENCOUNTER FOR IMMUNIZATION: ICD-10-CM

## 2023-11-13 DIAGNOSIS — B07.9 VIRAL WART, UNSPECIFIED: ICD-10-CM

## 2023-11-13 DIAGNOSIS — N13.8 BENIGN PROSTATIC HYPERPLASIA WITH LOWER URINARY TRACT SYMPMS: ICD-10-CM

## 2023-11-13 DIAGNOSIS — R05.3 CHRONIC COUGH: ICD-10-CM

## 2023-11-13 DIAGNOSIS — E66.9 OBESITY, UNSPECIFIED: ICD-10-CM

## 2023-11-13 DIAGNOSIS — R60.0 LOCALIZED EDEMA: ICD-10-CM

## 2023-11-13 PROCEDURE — G0439: CPT

## 2023-11-13 PROCEDURE — 99214 OFFICE O/P EST MOD 30 MIN: CPT | Mod: 25

## 2023-11-13 PROCEDURE — 36415 COLL VENOUS BLD VENIPUNCTURE: CPT

## 2023-11-13 PROCEDURE — G0009: CPT

## 2023-11-13 PROCEDURE — 90677 PCV20 VACCINE IM: CPT

## 2023-11-13 RX ORDER — SALICYLIC ACID 275 MG/ML
27.5 LIQUID TOPICAL
Qty: 1 | Refills: 0 | Status: ACTIVE | COMMUNITY
Start: 2023-11-13 | End: 1900-01-01

## 2023-11-13 RX ORDER — SUCRALFATE 1 G/10ML
1 SUSPENSION ORAL
Refills: 0 | Status: DISCONTINUED | COMMUNITY
End: 2023-11-13

## 2023-11-13 RX ORDER — ALBUTEROL SULFATE 90 UG/1
108 (90 BASE) INHALANT RESPIRATORY (INHALATION) EVERY 4 HOURS
Qty: 1 | Refills: 2 | Status: ACTIVE | COMMUNITY
Start: 2023-05-08

## 2023-11-14 ENCOUNTER — RX RENEWAL (OUTPATIENT)
Age: 78
End: 2023-11-14

## 2023-11-14 ENCOUNTER — APPOINTMENT (OUTPATIENT)
Dept: ALLERGY | Facility: CLINIC | Age: 78
End: 2023-11-14
Payer: MEDICARE

## 2023-11-14 VITALS
HEIGHT: 67 IN | WEIGHT: 208 LBS | HEART RATE: 70 BPM | BODY MASS INDEX: 32.65 KG/M2 | DIASTOLIC BLOOD PRESSURE: 73 MMHG | SYSTOLIC BLOOD PRESSURE: 139 MMHG | OXYGEN SATURATION: 95 %

## 2023-11-14 PROCEDURE — 99214 OFFICE O/P EST MOD 30 MIN: CPT

## 2023-11-29 ENCOUNTER — NON-APPOINTMENT (OUTPATIENT)
Age: 78
End: 2023-11-29

## 2023-11-29 ENCOUNTER — APPOINTMENT (OUTPATIENT)
Dept: CARDIOLOGY | Facility: CLINIC | Age: 78
End: 2023-11-29
Payer: MEDICARE

## 2023-11-29 VITALS
BODY MASS INDEX: 31.55 KG/M2 | OXYGEN SATURATION: 95 % | SYSTOLIC BLOOD PRESSURE: 146 MMHG | WEIGHT: 201 LBS | DIASTOLIC BLOOD PRESSURE: 62 MMHG | TEMPERATURE: 97.8 F | HEART RATE: 67 BPM | HEIGHT: 67 IN

## 2023-11-29 DIAGNOSIS — R94.31 ABNORMAL ELECTROCARDIOGRAM [ECG] [EKG]: ICD-10-CM

## 2023-11-29 DIAGNOSIS — R06.09 OTHER FORMS OF DYSPNEA: ICD-10-CM

## 2023-11-29 PROCEDURE — 93000 ELECTROCARDIOGRAM COMPLETE: CPT

## 2023-11-29 PROCEDURE — 99204 OFFICE O/P NEW MOD 45 MIN: CPT

## 2023-12-05 ENCOUNTER — APPOINTMENT (OUTPATIENT)
Dept: CARDIOLOGY | Facility: CLINIC | Age: 78
End: 2023-12-05
Payer: MEDICARE

## 2023-12-05 PROCEDURE — 93880 EXTRACRANIAL BILAT STUDY: CPT

## 2023-12-05 PROCEDURE — 93306 TTE W/DOPPLER COMPLETE: CPT

## 2023-12-07 ENCOUNTER — OUTPATIENT (OUTPATIENT)
Dept: OUTPATIENT SERVICES | Facility: HOSPITAL | Age: 78
LOS: 1 days | End: 2023-12-07
Payer: MEDICARE

## 2023-12-07 ENCOUNTER — APPOINTMENT (OUTPATIENT)
Dept: CV DIAGNOSTICS | Facility: HOSPITAL | Age: 78
End: 2023-12-07

## 2023-12-07 DIAGNOSIS — R06.09 OTHER FORMS OF DYSPNEA: ICD-10-CM

## 2023-12-07 PROCEDURE — 78452 HT MUSCLE IMAGE SPECT MULT: CPT | Mod: MH

## 2023-12-07 PROCEDURE — 93017 CV STRESS TEST TRACING ONLY: CPT

## 2023-12-07 PROCEDURE — 93016 CV STRESS TEST SUPVJ ONLY: CPT | Mod: MH

## 2023-12-07 PROCEDURE — A9500: CPT

## 2023-12-07 PROCEDURE — 78452 HT MUSCLE IMAGE SPECT MULT: CPT | Mod: 26,MH

## 2023-12-07 PROCEDURE — 93018 CV STRESS TEST I&R ONLY: CPT | Mod: MH

## 2023-12-23 ENCOUNTER — RX RENEWAL (OUTPATIENT)
Age: 78
End: 2023-12-23

## 2024-01-22 PROBLEM — E66.9 OBESITY (BMI 30.0-34.9): Status: ACTIVE | Noted: 2022-11-09

## 2024-01-22 PROBLEM — R60.0 PERIPHERAL EDEMA: Status: ACTIVE | Noted: 2022-11-09

## 2024-01-22 NOTE — HISTORY OF PRESENT ILLNESS
[de-identified] : 79 y/o male patient presents today for AWV: - reviewed age appropriate preventive screening exams - reviewed and updated PMH/Medications/Allergies/Surgical hx - f/u CAD/CKD/HTN/peripheral edema/obesity - BP slightly elevated today in office, denies any CP/SOB/Palpitations

## 2024-01-22 NOTE — HEALTH RISK ASSESSMENT
[Yes] : Yes [No] : In the past 12 months have you used drugs other than those required for medical reasons? No [No falls in past year] : Patient reported no falls in the past year [0] : 2) Feeling down, depressed, or hopeless: Not at all (0) [PHQ-2 Negative - No further assessment needed] : PHQ-2 Negative - No further assessment needed [[id number]] : 0 [Patient reported colonoscopy was normal] : Patient reported colonoscopy was normal [Change in mental status noted] : No change in mental status noted [None] : None [Alone] : lives alone [Retired] : retired [] :  [Sexually Active] : sexually active [High Risk Behavior] : no high risk behavior [Feels Safe at Home] : Feels safe at home [Fully functional (bathing, dressing, toileting, transferring, walking, feeding)] : Fully functional (bathing, dressing, toileting, transferring, walking, feeding) [Fully functional (using the telephone, shopping, preparing meals, housekeeping, doing laundry, using] : Fully functional and needs no help or supervision to perform IADLs (using the telephone, shopping, preparing meals, housekeeping, doing laundry, using transportation, managing medications and managing finances) [Reports changes in hearing] : Reports no changes in hearing [Reports changes in vision] : Reports no changes in vision [Reports changes in dental health] : Reports no changes in dental health [ColonoscopyDate] : 2/2022 [Never] : Never

## 2024-01-23 ENCOUNTER — APPOINTMENT (OUTPATIENT)
Dept: PULMONOLOGY | Facility: CLINIC | Age: 79
End: 2024-01-23
Payer: MEDICARE

## 2024-01-23 VITALS
BODY MASS INDEX: 31.55 KG/M2 | SYSTOLIC BLOOD PRESSURE: 144 MMHG | HEART RATE: 70 BPM | WEIGHT: 201 LBS | DIASTOLIC BLOOD PRESSURE: 72 MMHG | HEIGHT: 67 IN | OXYGEN SATURATION: 99 %

## 2024-01-23 DIAGNOSIS — J45.909 UNSPECIFIED ASTHMA, UNCOMPLICATED: ICD-10-CM

## 2024-01-23 PROCEDURE — 99214 OFFICE O/P EST MOD 30 MIN: CPT

## 2024-01-23 NOTE — HISTORY OF PRESENT ILLNESS
[Never] : never [TextBox_4] : Mr. VANGIE LOERA is a 78 year old man never smoker w asthma is here for f/u.  History: Developed acute bronchitis April/May 2023. Denies respiratory issues prior to this.  Patient reports a 3 months history of wheezing - dry coughing - treated with antibiotics and albuterol - he improved and then two weeks later his symptoms recurred and he was treated with prednisone with improvement and then recurrence and on his last visit he was then treated with Symbicort 80 BID and he improved.  Was seen by Dr Boxer - dose of Symbicort was increased. Today, he reports persistent MOJICA when walking up the stairs. He is no longer wheezing/coughing. He has been noticing SOB for the last 1-2 months  Interval Events: Has been doing really well on high-dose Symbicort and Singulair reports compliance.  Rare albuterol use.  Occasional/rare MOJICA/wheezing. No recent steroid use, no urgent care or ER visits  ROS:  +seasonal allergies; has been getting allergy shots for 35 years (stopped in ) denies fevers, chills, night sweats, unintentional weight loss denies known autoimmune disease  PMH: GERD, BPH, CKD Meds: per chart All: NKDA SH: never smoker; worked as a , no known exposures; former alcoholic, has not drank in >30 years.  FH: father  of lung ca at 79 (smoker) PMD: ALENA DAWKINS

## 2024-01-23 NOTE — ASSESSMENT
[FreeTextEntry1] : 78-year-old never smoker with moderate persistent asthma here for follow-up  #Asthma - Currently doing well on high-dose Symbicort. Pulmonary function testing Oct 2023 w no evidence of obstruction, moderate restriction, normal DLCO.  Suggestion of bronchodilator response not meeting ATS criteria. Prior blood work with evidence of peripheral eosinophilia.  Chest x-ray In June 2023 with clear lungs -- Decrease high dose Symbicot to 1 puff bid, then transition to low dose Symbicort if remains asymptomatic.  -- Continue Singulair -- Repeat PFTs next visit  #HCM: Immunizations: had flu vaccine 2023 season; Pneumococcal vaccine approx 2019  All questions answered. Patient in agreement with plan.  Follow up in 3-4 mo or sooner if needed.

## 2024-01-23 NOTE — CONSULT LETTER
[Dear  ___] : Dear  [unfilled], [Consult Letter:] : I had the pleasure of evaluating your patient, [unfilled]. [Please see my note below.] : Please see my note below. [Consult Closing:] : Thank you very much for allowing me to participate in the care of this patient.  If you have any questions, please do not hesitate to contact me. [FreeTextEntry3] : Sincerely,  Rosa Ho MD [facility] tel: [phone] fax: [phone]

## 2024-01-25 ENCOUNTER — RX RENEWAL (OUTPATIENT)
Age: 79
End: 2024-01-25

## 2024-01-31 LAB
25(OH)D3 SERPL-MCNC: 54.6 NG/ML
ALBUMIN SERPL ELPH-MCNC: 4.6 G/DL
ALP BLD-CCNC: 61 U/L
ALT SERPL-CCNC: 22 U/L
ANION GAP SERPL CALC-SCNC: 15 MMOL/L
APPEARANCE: CLEAR
AST SERPL-CCNC: 21 U/L
BACTERIA: NEGATIVE /HPF
BASOPHILS # BLD AUTO: 0 K/UL
BASOPHILS NFR BLD AUTO: 0 %
BILIRUB SERPL-MCNC: 0.2 MG/DL
BILIRUBIN URINE: NEGATIVE
BLOOD URINE: NEGATIVE
BUN SERPL-MCNC: 35 MG/DL
CALCIUM SERPL-MCNC: 9.2 MG/DL
CAST: 0 /LPF
CHLORIDE SERPL-SCNC: 107 MMOL/L
CHOLEST SERPL-MCNC: 205 MG/DL
CO2 SERPL-SCNC: 19 MMOL/L
COLOR: YELLOW
CREAT SERPL-MCNC: 2.19 MG/DL
CREAT SPEC-SCNC: 102 MG/DL
EGFR: 30 ML/MIN/1.73M2
EOSINOPHIL # BLD AUTO: 0.43 K/UL
EOSINOPHIL NFR BLD AUTO: 4.4 %
EPITHELIAL CELLS: 0 /HPF
ESTIMATED AVERAGE GLUCOSE: 103 MG/DL
GLUCOSE QUALITATIVE U: NEGATIVE MG/DL
GLUCOSE SERPL-MCNC: 99 MG/DL
HBA1C MFR BLD HPLC: 5.2 %
HCT VFR BLD CALC: 36.5 %
HDLC SERPL-MCNC: 63 MG/DL
HGB BLD-MCNC: 12.2 G/DL
KETONES URINE: NEGATIVE MG/DL
LDLC SERPL CALC-MCNC: 130 MG/DL
LEUKOCYTE ESTERASE URINE: ABNORMAL
LYMPHOCYTES # BLD AUTO: 2.57 K/UL
LYMPHOCYTES NFR BLD AUTO: 26.3 %
MAN DIFF?: NORMAL
MCHC RBC-ENTMCNC: 33.4 GM/DL
MCHC RBC-ENTMCNC: 36.9 PG
MCV RBC AUTO: 110.3 FL
MICROALBUMIN 24H UR DL<=1MG/L-MCNC: 49.7 MG/DL
MICROALBUMIN/CREAT 24H UR-RTO: 488 MG/G
MICROSCOPIC-UA: NORMAL
MONOCYTES # BLD AUTO: 1.12 K/UL
MONOCYTES NFR BLD AUTO: 11.4 %
NEUTROPHILS # BLD AUTO: 5.67 K/UL
NEUTROPHILS NFR BLD AUTO: 57.9 %
NITRITE URINE: NEGATIVE
NONHDLC SERPL-MCNC: 141 MG/DL
PH URINE: 6
PLATELET # BLD AUTO: 230 K/UL
POTASSIUM SERPL-SCNC: 4.2 MMOL/L
PROT SERPL-MCNC: 6.8 G/DL
PROTEIN URINE: 100 MG/DL
PSA FREE FLD-MCNC: 59 %
PSA FREE SERPL-MCNC: 0.25 NG/ML
PSA SERPL-MCNC: 0.42 NG/ML
RBC # BLD: 3.31 M/UL
RBC # FLD: 14.4 %
RED BLOOD CELLS URINE: 0 /HPF
SODIUM SERPL-SCNC: 141 MMOL/L
SPECIFIC GRAVITY URINE: 1.02
TRIGL SERPL-MCNC: 62 MG/DL
UROBILINOGEN URINE: 0.2 MG/DL
WBC # FLD AUTO: 9.79 K/UL
WHITE BLOOD CELLS URINE: 1 /HPF

## 2024-02-22 ENCOUNTER — RX RENEWAL (OUTPATIENT)
Age: 79
End: 2024-02-22

## 2024-03-11 ENCOUNTER — APPOINTMENT (OUTPATIENT)
Dept: CARDIOLOGY | Facility: CLINIC | Age: 79
End: 2024-03-11
Payer: MEDICARE

## 2024-03-11 VITALS
SYSTOLIC BLOOD PRESSURE: 155 MMHG | HEIGHT: 67 IN | BODY MASS INDEX: 31.55 KG/M2 | OXYGEN SATURATION: 95 % | HEART RATE: 72 BPM | WEIGHT: 201 LBS | DIASTOLIC BLOOD PRESSURE: 81 MMHG | TEMPERATURE: 98 F

## 2024-03-11 DIAGNOSIS — I10 ESSENTIAL (PRIMARY) HYPERTENSION: ICD-10-CM

## 2024-03-11 DIAGNOSIS — N18.30 CHRONIC KIDNEY DISEASE, STAGE 3 UNSPECIFIED: ICD-10-CM

## 2024-03-11 PROCEDURE — 93000 ELECTROCARDIOGRAM COMPLETE: CPT

## 2024-03-11 PROCEDURE — 99215 OFFICE O/P EST HI 40 MIN: CPT

## 2024-03-11 PROCEDURE — G2211 COMPLEX E/M VISIT ADD ON: CPT

## 2024-03-11 NOTE — PHYSICAL EXAM
[Well Developed] : well developed [Well Nourished] : well nourished [No Acute Distress] : no acute distress [Normal Venous Pressure] : normal venous pressure [Normal S1, S2] : normal S1, S2 [No Murmur] : no murmur [Clear Lung Fields] : clear lung fields [Good Air Entry] : good air entry [No Respiratory Distress] : no respiratory distress  [Soft] : abdomen soft [Non Tender] : non-tender [Normal Gait] : normal gait [Moves all extremities] : moves all extremities [No Focal Deficits] : no focal deficits [Alert and Oriented] : alert and oriented

## 2024-03-11 NOTE — REVIEW OF SYSTEMS
[Fever] : no fever [Headache] : no headache [Weight Gain (___ Lbs)] : no recent weight gain [Chills] : no chills [Feeling Fatigued] : not feeling fatigued [Weight Loss (___ Lbs)] : no recent weight loss [Blurry Vision] : no blurred vision [Sore Throat] : no sore throat [SOB] : shortness of breath [Dyspnea on exertion] : not dyspnea during exertion [Chest Discomfort] : no chest discomfort [Lower Ext Edema] : no extremity edema [Orthopnea] : no orthopnea [Syncope] : no syncope [Cough] : no cough [Wheezing] : no wheezing [Nausea] : no nausea [Vomiting] : no vomiting [Dizziness] : no dizziness [Confusion] : no confusion was observed [Easy Bleeding] : no tendency for easy bleeding [Easy Bruising] : no tendency for easy bruising

## 2024-03-11 NOTE — DISCUSSION/SUMMARY
[FreeTextEntry1] : 78M w HTN CKD presents for follow up  CAD -MOJICA resolved -nuc stress reviewed, fixed defects -on asa -will start lipitor today -pt unable to tolerate bb tx, not interested in trying again   f/u 3-4 months unless requires sooner 40 min spent on complete encounter  [EKG obtained to assist in diagnosis and management of assessed problem(s)] : EKG obtained to assist in diagnosis and management of assessed problem(s)

## 2024-03-11 NOTE — HISTORY OF PRESENT ILLNESS
[FreeTextEntry1] : 78M w HTN CKD presents for f/u Sent in by PMD: Dr Alonzo Erazo: Dr Rosa Ho  Previously, pt last seen 11/23 for an initial eval, +MOJICA. TTE showing preserved LV EF mod MR TR normal RV. pharm nuc stress showing medium severe fixed defects  pt now presents for follow up. today,  feeling well overall. no cp or sob at rest or on exertion. Denies palpitations, dizziness, diaphoresis, syncope.  pt sates he did not tolerate toprol, was experiencing dizziness, pre-sycnope.  pt states he did have a poor reaction to beta blockers 30 years ago. pt stopped toprol.  now on asa 81 amlodipine 10, (off losartan, pt unclear if he was ever on it, it is on his med list, pt states he is not taking it currently) agreeable to starting statin tx today. not interested in BB tx   no recent falls.  Exercise: walking no issues. Diet: none  Prev cardiac history: none Previous cardiac testing: stress test 4 years ago, normal per pt. Recent labs:   Med hx: HTN CKD Sx hx: eye sx for glaucoma  Family hx: no known cardiac hx Social hx: lives in [place], with fiance x20 years. prev . retired . never tob. former etoh, quit 30 yrs ago. no drugs Meds: flomax norvasc 10 asa 81 montelukast Allergies: nkda

## 2024-04-02 ENCOUNTER — APPOINTMENT (OUTPATIENT)
Dept: ALLERGY | Facility: CLINIC | Age: 79
End: 2024-04-02
Payer: MEDICARE

## 2024-04-02 ENCOUNTER — RX RENEWAL (OUTPATIENT)
Age: 79
End: 2024-04-02

## 2024-04-02 VITALS
HEIGHT: 67 IN | SYSTOLIC BLOOD PRESSURE: 135 MMHG | OXYGEN SATURATION: 95 % | HEART RATE: 77 BPM | WEIGHT: 200 LBS | BODY MASS INDEX: 31.39 KG/M2 | DIASTOLIC BLOOD PRESSURE: 72 MMHG

## 2024-04-02 PROCEDURE — 99214 OFFICE O/P EST MOD 30 MIN: CPT

## 2024-04-02 NOTE — SOCIAL HISTORY
[FreeTextEntry1] : Retired - PO Lives - with fiance [House] : [unfilled] lives in a house  [Radiator/Baseboard] : heating provided by radiator(s)/baseboard(s) [Window Units] : air conditioning provided by window units [Bedroom] : not in the bedroom [Basement] : not in the basement [Living Area] : not in the living area [Dog] : dog [] :  [Smokers in Household] : there are no smokers in the home [de-identified] : area rug  [de-identified] : lives in different section of house

## 2024-04-02 NOTE — PHYSICAL EXAM
[Alert] : alert [Well Nourished] : well nourished [Healthy Appearance] : healthy appearance [No Acute Distress] : no acute distress [Well Developed] : well developed [Normal Voice/Communication] : normal voice communication [Normal Nasal Mucosa] : the nasal mucosa was normal [No Neck Mass] : no neck mass was observed [No Thyroid Mass] : no thyroid mass [No LAD] : no lymphadenopathy [Supple] : the neck was supple [Normal Rate and Effort] : normal respiratory rhythm and effort [No Retractions] : no retractions [No Crackles] : no crackles [Bilateral Audible Breath Sounds] : bilateral audible breath sounds [Normal Rate] : heart rate was normal  [Wheezing] : no wheezing was heard [Normal S1, S2] : normal S1 and S2 [Regular Rhythm] : with a regular rhythm [Normal Cervical Lymph Nodes] : cervical [Skin Intact] : skin intact  [No Rash] : no rash [Normal Mood] : mood was normal [Judgment and Insight Age Appropriate] : judgement and insight is age appropriate [Normal Affect] : affect was normal [Alert, Awake, Oriented as Age-Appropriate] : alert, awake, oriented as age appropriate

## 2024-04-02 NOTE — ASSESSMENT
[FreeTextEntry1] : Moderate persistent asthma:  Continue Symibicort 160 2 puffs BID Continue albuterol 2 puffs QID prn   Perennial allergic rhino conjunctivitis:  Pataday 0.70% QD prn  Flonase 2 puffs QD  Claritin 10 mg QD or Xyzal 5 mg - he will take this prn only at this time.   RV 6 months or prn

## 2024-04-02 NOTE — REVIEW OF SYSTEMS
[Nl] : Endocrine [FreeTextEntry5] : hypertension  [de-identified] : BPH - borderline kidney funtions

## 2024-04-02 NOTE — HISTORY OF PRESENT ILLNESS
[de-identified] : Patient has been taking Symbicort 160 BID - Flonase - prn albuterol - Pataday - he has some kidney insufficiency - he dropped out Claritin and Xyzal which he takes prn now.   No use of albuterol.

## 2024-04-17 ENCOUNTER — APPOINTMENT (OUTPATIENT)
Dept: CARDIOLOGY | Facility: CLINIC | Age: 79
End: 2024-04-17
Payer: MEDICARE

## 2024-04-17 PROCEDURE — 93242 EXT ECG>48HR<7D RECORDING: CPT

## 2024-04-17 PROCEDURE — 93244 EXT ECG>48HR<7D REV&INTERPJ: CPT

## 2024-04-17 PROCEDURE — ZZZZZ: CPT

## 2024-04-25 ENCOUNTER — EMERGENCY (EMERGENCY)
Facility: HOSPITAL | Age: 79
LOS: 1 days | Discharge: HOME | End: 2024-04-25
Attending: EMERGENCY MEDICINE
Payer: MEDICARE

## 2024-04-25 VITALS
WEIGHT: 199.96 LBS | SYSTOLIC BLOOD PRESSURE: 155 MMHG | HEIGHT: 68 IN | RESPIRATION RATE: 16 BRPM | DIASTOLIC BLOOD PRESSURE: 83 MMHG | TEMPERATURE: 98 F | HEART RATE: 66 BPM | OXYGEN SATURATION: 96 %

## 2024-04-25 VITALS
HEART RATE: 79 BPM | OXYGEN SATURATION: 100 % | RESPIRATION RATE: 18 BRPM | DIASTOLIC BLOOD PRESSURE: 82 MMHG | SYSTOLIC BLOOD PRESSURE: 147 MMHG | TEMPERATURE: 98 F

## 2024-04-25 LAB
ALBUMIN SERPL ELPH-MCNC: 4.2 G/DL — SIGNIFICANT CHANGE UP (ref 3.3–5)
ALP SERPL-CCNC: 57 U/L — SIGNIFICANT CHANGE UP (ref 40–120)
ALT FLD-CCNC: 13 U/L — SIGNIFICANT CHANGE UP (ref 10–45)
ANION GAP SERPL CALC-SCNC: 11 MMOL/L — SIGNIFICANT CHANGE UP (ref 5–17)
AST SERPL-CCNC: 17 U/L — SIGNIFICANT CHANGE UP (ref 10–40)
BASOPHILS # BLD AUTO: 0.03 K/UL — SIGNIFICANT CHANGE UP (ref 0–0.2)
BASOPHILS NFR BLD AUTO: 0.4 % — SIGNIFICANT CHANGE UP (ref 0–2)
BILIRUB SERPL-MCNC: 0.4 MG/DL — SIGNIFICANT CHANGE UP (ref 0.2–1.2)
BUN SERPL-MCNC: 38 MG/DL — HIGH (ref 7–23)
CALCIUM SERPL-MCNC: 9.7 MG/DL — SIGNIFICANT CHANGE UP (ref 8.4–10.5)
CHLORIDE SERPL-SCNC: 108 MMOL/L — SIGNIFICANT CHANGE UP (ref 96–108)
CO2 SERPL-SCNC: 23 MMOL/L — SIGNIFICANT CHANGE UP (ref 22–31)
CREAT SERPL-MCNC: 2.39 MG/DL — HIGH (ref 0.5–1.3)
EGFR: 27 ML/MIN/1.73M2 — LOW
EOSINOPHIL # BLD AUTO: 0.46 K/UL — SIGNIFICANT CHANGE UP (ref 0–0.5)
EOSINOPHIL NFR BLD AUTO: 5.9 % — SIGNIFICANT CHANGE UP (ref 0–6)
GLUCOSE SERPL-MCNC: 101 MG/DL — HIGH (ref 70–99)
HCT VFR BLD CALC: 34 % — LOW (ref 39–50)
HGB BLD-MCNC: 11.9 G/DL — LOW (ref 13–17)
IMM GRANULOCYTES NFR BLD AUTO: 0.4 % — SIGNIFICANT CHANGE UP (ref 0–0.9)
LYMPHOCYTES # BLD AUTO: 1.08 K/UL — SIGNIFICANT CHANGE UP (ref 1–3.3)
LYMPHOCYTES # BLD AUTO: 13.8 % — SIGNIFICANT CHANGE UP (ref 13–44)
MAGNESIUM SERPL-MCNC: 2.4 MG/DL — SIGNIFICANT CHANGE UP (ref 1.6–2.6)
MCHC RBC-ENTMCNC: 34.3 PG — HIGH (ref 27–34)
MCHC RBC-ENTMCNC: 35 GM/DL — SIGNIFICANT CHANGE UP (ref 32–36)
MCV RBC AUTO: 98 FL — SIGNIFICANT CHANGE UP (ref 80–100)
MONOCYTES # BLD AUTO: 0.93 K/UL — HIGH (ref 0–0.9)
MONOCYTES NFR BLD AUTO: 11.9 % — SIGNIFICANT CHANGE UP (ref 2–14)
NEUTROPHILS # BLD AUTO: 5.3 K/UL — SIGNIFICANT CHANGE UP (ref 1.8–7.4)
NEUTROPHILS NFR BLD AUTO: 67.6 % — SIGNIFICANT CHANGE UP (ref 43–77)
NRBC # BLD: 0 /100 WBCS — SIGNIFICANT CHANGE UP (ref 0–0)
PHOSPHATE SERPL-MCNC: 3.9 MG/DL — SIGNIFICANT CHANGE UP (ref 2.5–4.5)
PLATELET # BLD AUTO: 225 K/UL — SIGNIFICANT CHANGE UP (ref 150–400)
POTASSIUM SERPL-MCNC: 4.7 MMOL/L — SIGNIFICANT CHANGE UP (ref 3.5–5.3)
POTASSIUM SERPL-SCNC: 4.7 MMOL/L — SIGNIFICANT CHANGE UP (ref 3.5–5.3)
PROT SERPL-MCNC: 7 G/DL — SIGNIFICANT CHANGE UP (ref 6–8.3)
RBC # BLD: 3.47 M/UL — LOW (ref 4.2–5.8)
RBC # FLD: 12.8 % — SIGNIFICANT CHANGE UP (ref 10.3–14.5)
SODIUM SERPL-SCNC: 142 MMOL/L — SIGNIFICANT CHANGE UP (ref 135–145)
WBC # BLD: 7.83 K/UL — SIGNIFICANT CHANGE UP (ref 3.8–10.5)
WBC # FLD AUTO: 7.83 K/UL — SIGNIFICANT CHANGE UP (ref 3.8–10.5)

## 2024-04-25 PROCEDURE — 99285 EMERGENCY DEPT VISIT HI MDM: CPT | Mod: 25

## 2024-04-25 PROCEDURE — 70450 CT HEAD/BRAIN W/O DYE: CPT | Mod: XU,MC

## 2024-04-25 PROCEDURE — 85025 COMPLETE CBC W/AUTO DIFF WBC: CPT

## 2024-04-25 PROCEDURE — 99285 EMERGENCY DEPT VISIT HI MDM: CPT | Mod: GC

## 2024-04-25 PROCEDURE — 70496 CT ANGIOGRAPHY HEAD: CPT | Mod: MC

## 2024-04-25 PROCEDURE — 84100 ASSAY OF PHOSPHORUS: CPT

## 2024-04-25 PROCEDURE — 70498 CT ANGIOGRAPHY NECK: CPT | Mod: 26,MC

## 2024-04-25 PROCEDURE — 93005 ELECTROCARDIOGRAM TRACING: CPT

## 2024-04-25 PROCEDURE — 70496 CT ANGIOGRAPHY HEAD: CPT | Mod: 26,MC

## 2024-04-25 PROCEDURE — 80053 COMPREHEN METABOLIC PANEL: CPT

## 2024-04-25 PROCEDURE — 83735 ASSAY OF MAGNESIUM: CPT

## 2024-04-25 PROCEDURE — 93010 ELECTROCARDIOGRAM REPORT: CPT

## 2024-04-25 PROCEDURE — 70450 CT HEAD/BRAIN W/O DYE: CPT | Mod: 26,59,MC

## 2024-04-25 PROCEDURE — 70498 CT ANGIOGRAPHY NECK: CPT | Mod: MC

## 2024-04-25 NOTE — ED PROVIDER NOTE - PATIENT PORTAL LINK FT
You can access the FollowMyHealth Patient Portal offered by [facility] by registering at the following website: [email or web address]. By joining [facility]’s FollowMyHealth portal, you will also be able to view your health information using other applications (apps) compatible with our system.

## 2024-04-25 NOTE — ED PROVIDER NOTE - PHYSICAL EXAMINATION
Vital Signs Stable  Gen: well appearing, NAD  HEENT: no conjunctivitis  Cardiac: regular rate rhythm, normal S1S2, no carotid bruits auscultated  Chest: CTA BL, no wheeze or crackles  Abdomen: normal BS, soft, NT  Extremity: no gross deformity, good perfusion  Skin: no rash  NEURO: no gross motor or sensory deficits, CN2-12 grossly intact, normal speech, PERRL, EOMI, normal gait, AAOx3

## 2024-04-25 NOTE — ED PROVIDER NOTE - OBJECTIVE STATEMENT
78-year-old male with PMH cataracts, hypertension, hyperlipidemia, CKD presents with 2 weeks of intermittent dizziness described as lightheadedness associated with right-sided facial tingling down to his right arm.  This happens 2-3 times a day when patient is standing urinating or when patient is standing sitting and reading his book.  Patient usually has the symptoms when he is looking down.  Patient saw his cardiologist and had a Zio patch that was sent in this weekend but did not get the result get the results back yet.  Patient had a normal stress test last month.  Patient was told by his cardiologist to come to the emergency department if this keeps happening or if he gets worse.  Patient states that he had an episode this morning when he was looking down on his cell phone that felt worse.  When patient feels dizziness he feels like he is off balance as well.  This lasted for 30 seconds at a time.  Patient denies fevers, chills, chest pain, shortness of breath, cough, congestion, rhinorrhea, nausea, vomiting, abdominal pain, dysuria, constipation, diarrhea.

## 2024-04-25 NOTE — ED ADULT NURSE NOTE - OBJECTIVE STATEMENT
77 yo male presents to the ED AAox4 ambulatory with complaints of dizziness x 1 week. PMH HTN, Asthma. Pt states his dizziness has worsened last night and states from sitting to standing, he begins to feel dizzy. Denies headache, vision changes, chest pain, shortness of breath, abdominal pain, nausea, vomiting, diarrhea, fevers, chills, dysuria, hematuria, recent illness travel

## 2024-04-25 NOTE — ED PROVIDER NOTE - PROGRESS NOTE DETAILS
Ki Bernardo MD PGY-2: neuro aware, will see pt Ki Bernardo MD PGY-2: Neuro saw patient, recommends CTA head and neck w iv contrast despite GFR and creatinine. This is reasonable; the benefits of obtaining the scans with contrast outweighs the risks. Will proceed with CTs Ki Bernardo MD PGY-2: CTAs show b/l <50% carotid stenosis. Neuro updated. Will get back to us on a dispo plan Ki Bernardo MD PGY-2: pt ok to dc home per neuro

## 2024-04-25 NOTE — ED PROVIDER NOTE - NSFOLLOWUPINSTRUCTIONS_ED_ALL_ED_FT
You were seen for dizziness. Your results were discussed with you and are attached. Continue taking Aspirin 81mg everyday. Follow up with a neurologist, your cardiologist, and your primary care doctor. You may also follow up with an ENT doctor. You should see a neurologist to obtain an MRI in the outpatient setting.    You can follow up with Dr. Olegario Jovel or Dr. Reymundo Alamo. Call [phone] to schedule an appointment within the next 1-2 weeks. Office is located at [address] [place] [address]. If you are without insurance, you can follow up with the [facility], located in [address] [place] [address] at [address] [place] [id number]. You can call [phone] to schedule this appointment.      Dizziness    Dizziness can manifest as a feeling of unsteadiness or light-headedness. You may feel like you are about to faint. This condition can be caused by a number of things, including medicines, dehydration, or illness. Drink enough fluid to keep your urine clear or pale yellow. Do not drink alcohol and limit your caffeine intake. Avoid quick or sudden movements.  Rise slowly from chairs and steady yourself until you feel okay. In the morning, first sit up on the side of the bed.    SEEK IMMEDIATE MEDICAL CARE IF YOU HAVE ANY OF THE FOLLOWING SYMPTOMS: vomiting, changes in your vision or speech, weakness in your arms or legs, trouble speaking or swallowing, chest pain, abdominal pain, shortness of breath, sweating, bleeding, headache, neck pain, or fever.    Rest, drink plenty of fluids.  Advance activity as tolerated.  Continue all previously prescribed medications as directed.  Follow up with your primary care physician in 48-72 hours- bring copies of your results.  Return to the ER for worsening or persistent symptoms, and/or ANY NEW OR CONCERNING SYMPTOMS. If you have issues obtaining follow up, please call: [phone] to obtain a doctor or specialist who takes your insurance in your area.

## 2024-04-25 NOTE — CHART NOTE - NSCHARTNOTEFT_GEN_A_CORE
IMAGING   CT HEAD:  No acute intracranial hemorrhage or mass effect. Chronic changes, as   described.    CTA HEAD:  No large vessel occlusion.  Severe atherosclerotic narrowing of the supraclinoid ICA segments   bilaterally.  Irregularity with moderate focal narrowing of the left P2 PCA.  Question tiny 1-2 mm right PCOM origin infundibulum versus saccular   aneurysm.    CTA NECK:  Atherosclerotic changes at both carotid bifurcations with less than 50%   stenosis at the ICA origin/proximal segments bilaterally.    IMPRESSION   New onset episodic vertiginous symptoms likely 2/2 peripheral etiology (ears). Since R sided paresthesias are intermittent and associated with vertigo episodes lower concern for acute CVA at this time- could also be 2/2 atypical migraine. Would treat for secondary stroke prevention     RECOMMENDATION  [] Start Aspirin 81mg Qd   [] MRI brain w and w/o contrast with cuts in IAC to rule out mass lesions and posterior circulation infarcts, central pathology if no resolution of symptoms- can be done outpatient  [] meclizine PRN   [] Follow up with Neurology as outpatient. Patient can follow up with Dr. Olegario Jovel or Dr. Reymundo Alamo after discharge. Please instruct the patient/family to call [phone] to schedule an appointment within the next 1-2 weeks. Office is located at [address] [place] [address]. If pt is without insurance, pt can contact Patient can follow up with [facility], located in [address] [place] [address] at [address] [place] [address]. Patient/family can call [phone] to schedule this appointment.    Discussed with stroke fellow Elias [place]  under supervision of neurology attending Dr. Heck

## 2024-04-25 NOTE — ED PROVIDER NOTE - NSICDXPASTMEDICALHX_GEN_ALL_CORE_FT
PAST MEDICAL HISTORY:  CKD (chronic kidney disease)     H/O cataract     HLD (hyperlipidemia)     HTN (hypertension)

## 2024-04-25 NOTE — CONSULT NOTE ADULT - ATTENDING COMMENTS
DOS 4/26  agere with above   outaptient f/u for MRI IAC  asa and statin for now   meclizline prn  Reymundo Alamo MD  Vascular Neurology  Office: [phone]

## 2024-04-25 NOTE — ED ADULT NURSE NOTE - NSFALLHARMRISKINTERV_ED_ALL_ED
Assistance OOB with selected safe patient handling equipment if applicable/Assistance with ambulation/Communicate risk of Fall with Harm to all staff, patient, and family/Encourage patient to sit up slowly, dangle for a short time, stand at bedside before walking/Monitor gait and stability/Orthostatic vital signs/Provide visual cue: red socks, yellow wristband, yellow gown, etc/Reinforce activity limits and safety measures with patient and family/Bed in lowest position, wheels locked, appropriate side rails in place/Call bell, personal items and telephone in reach/Instruct patient to call for assistance before getting out of bed/chair/stretcher/Non-slip footwear applied when patient is off stretcher/[place] to call system/Physically safe environment - no spills, clutter or unnecessary equipment/Purposeful Proactive Rounding/Room/bathroom lighting operational, light cord in reach

## 2024-04-25 NOTE — ED PROVIDER NOTE - CLINICAL SUMMARY MEDICAL DECISION MAKING FREE TEXT BOX
78-year-old male with PMH HTN, HLD, CKD, cataracts presents with 2 weeks of intermittent dizziness described as lightheadedness and feeling off balance when he looks down and when he is standing that last for about 30 seconds at a time associated with tingling on his right side of the face and right side of the arm.  Exam shows no focal neurologic deficits, normal gait, no reproduction of symptoms when sitting patient up from a lying position.  No carotid bruits auscultated.  Given patient's symptoms are usually when he was looking down concern for carotid artery stenosis.  Will obtain CAT scan scan head and neck with IV contrast, basic labs.  Dispo pending results. 78-year-old male with PMH HTN, HLD, CKD, cataracts presents with 2 weeks of intermittent dizziness described as lightheadedness and feeling off balance when he looks down and when he is standing that last for about 30 seconds at a time associated with tingling on his right side of the face and right side of the arm.  Exam shows no focal neurologic deficits, normal gait, no reproduction of symptoms when sitting patient up from a lying position.  No carotid bruits auscultated.  Given patient's symptoms are usually when he was looking down concern for carotid artery stenosis.  Will obtain CAT scan scan head and neck with IV contrast, basic labs.  Dispo pending results.    Kaitlyn: 78 year old male with pmhx of htn, hld, ckd, here with intermittent dizzines x 2 weeks. described as lightheadness, feeling off balance when he looks down and when he is standing. lasts about 30 secs at the time.  als with tinglin to R face and r side arm. PE: att exam: patient awake alert NAD . LUNGS CTAB no wheeze no crackle. CARD RRR no m/r/g.  Abdomen soft NT ND no rebound no guarding no CVA tenderness. EXT WWP no edema no calf tenderness CV 2+DP/PT bilaterally. neuro A&Ox3, no focal deficits, negative romberg, normal finger to nose.  skin warm and dry no rash plan: will get labs, imaging, ct head, cta head/neck, neuro consult, reassess

## 2024-04-25 NOTE — CONSULT NOTE ADULT - ASSESSMENT
Impression:  []MRI brain w and w/o contrast with cuts in IAC to rule out mass lesions and posterior circulation infarcts, central pathology if no resolution of symptoms- can be done outpatient  [] PRN meclizine 25MG PO Q8HR, reglan and ativan or promethazine.  [] If QTc not prolonged, then can give Zofran PRN for nausea.  []May teach epley manuever for vertigo.  []F/U with neurootologist outpatient, if vertigo found to be of peripheral etiology  []Vestibular therapy outpatient, if vertigo found to be of peripheral etiology    To be discussed with neurology attending and will be formally staffed by attending during morning rounds. Recommendations will be finalized once signed by attending.    HPI:  78-year-old male with PMH cataracts, hypertension, hyperlipidemia, CKD presents with 2 weeks of intermittent "dizziness" episodes described as lightheadedness/"on a boat feeling"/ off balance associated with transient right-sided facial tingling down to his RUE, and sometimes to RLE.  This happens 2-3 times a day when patient is standing or when patient is looking down to urinate.  Patient usually has the symptoms when he is looking down.  Patient saw his cardiologist and had a Zio patch that was sent in this weekend but did not get the result get the results back yet. Episodes last for 30 seconds at a time. After episodes pt may get a temporary headache above his right eye. Patient denies any recent n/v, focal weakness, changes in vision/hearing, head trauma, fevers, chills but he has had bilateral chronic hearing loss for few years.     Impression: New onset episodic vertiginous symptoms likely 2/2 peripheral etiology (ears). Since R sided paresthesias are intermittent and associated with episodes lower concern for acute CVA at this time- could also be 2/2 atypical migraine.     Recommendations:   []MRI brain w and w/o contrast with cuts in IAC to rule out mass lesions and posterior circulation infarcts, central pathology if no resolution of symptoms- can be done outpatient  [] PRN meclizine 25MG PO Q8HR,   []May teach epley manuever for vertigo.  []F/U with neurootologist outpatient, if vertigo found to be of peripheral etiology  []Vestibular therapy outpatient, if vertigo found to be of peripheral etiology    Case discussed with stroke fellow Elias Zhu under supervision of neurology attending Dr. Muniz.    HPI:  78-year-old male with PMH cataracts, hypertension, hyperlipidemia, CKD presents with 2 weeks of intermittent "dizziness" episodes described as lightheadedness/"on a boat feeling"/ off balance associated with transient right-sided facial tingling down to his RUE, and sometimes to RLE.  This happens 2-3 times a day when patient is standing or when patient is looking down to urinate.  Patient usually has the symptoms when he is looking down.  Patient saw his cardiologist and had a Zio patch that was sent in this weekend but did not get the result get the results back yet. Episodes last for 30 seconds at a time. After episodes pt may get a temporary headache above his right eye. Patient denies any recent n/v, focal weakness, changes in vision/hearing, head trauma, fevers, chills but he has had bilateral chronic hearing loss for few years.     Impression: New onset episodic vertiginous symptoms likely 2/2 peripheral etiology (ears). Since R sided paresthesias are intermittent and associated with vertigo episodes lower concern for acute CVA at this time- could also be 2/2 atypical migraine.     Recommendations:   []MRI brain w and w/o contrast with cuts in IAC to rule out mass lesions and posterior circulation infarcts, central pathology if no resolution of symptoms- can be done outpatient  [] PRN meclizine 25MG PO Q8HR,   [] Orthostatics  []May teach epley manuever for vertigo.  []F/U with neurootologist outpatient, if vertigo found to be of peripheral etiology  []Vestibular therapy outpatient, if vertigo found to be of peripheral etiology    Patient can follow up with Dr. Alamo  after discharge. Please instruct the patient to call [phone] to schedule an appointment. Office is located at [address] [place] [address], [place] [address].     Case discussed with stroke fellow Elias Zhu under supervision of neurology attending Dr. Muniz.    HPI:  78-year-old male with PMH cataracts, hypertension, hyperlipidemia, CKD presents with 2 weeks of intermittent "dizziness" episodes described as lightheadedness/"on a boat feeling"/ off balance associated with transient right-sided facial tingling down to his RUE, and sometimes to RLE.  This happens 2-3 times a day when patient is standing or when patient is looking down to urinate.  Patient usually has the symptoms when he is looking down.  Patient saw his cardiologist and had a Zio patch that was sent in this weekend but did not get the result get the results back yet. Episodes last for 30 seconds at a time. After episodes pt may get a temporary headache above his right eye. Patient denies any recent n/v, focal weakness, changes in vision/hearing, head trauma, fevers, chills but he has had bilateral chronic hearing loss for few years.     Impression: New onset episodic vertiginous symptoms likely 2/2 peripheral etiology (ears). Since R sided paresthesias are intermittent and associated with vertigo episodes lower concern for acute CVA at this time- could also be 2/2 atypical migraine.     Recommendations:   [] CTA head and neck with contrast now- if negative for acute findings, pt can be discharged with further outpatient work up  []MRI brain w and w/o contrast with cuts in IAC to rule out mass lesions and posterior circulation infarcts, central pathology if no resolution of symptoms- can be done outpatient  [] PRN meclizine 25MG PO Q8HR,   [] Orthostatics  []May teach epley manuever for vertigo.  []F/U with neurootologist outpatient, if vertigo found to be of peripheral etiology  []Vestibular therapy outpatient, if vertigo found to be of peripheral etiology    Patient can follow up with Dr. Alamo  after discharge. Please instruct the patient to call [phone] to schedule an appointment. Office is located at [address] [facility], [place] [address].     Case discussed with stroke fellow Elias Zhu under supervision of neurology attending Dr. Muniz.    HPI:  78-year-old male with PMH cataracts, hypertension, hyperlipidemia, CKD presents with 2 weeks of intermittent "dizziness" episodes described as lightheadedness/"on a boat feeling"/ off balance associated with transient right-sided facial tingling down to his RUE, and sometimes to RLE.  This happens 2-3 times a day when patient is standing or when patient is looking down to urinate.  Patient usually has the symptoms when he is looking down.  Patient saw his cardiologist and had a Zio patch that was sent in this weekend but did not get the result get the results back yet. Episodes last for 30 seconds at a time. After episodes pt may get a temporary headache above his right eye. Patient denies any recent n/v, focal weakness, changes in vision/hearing, head trauma, fevers, chills but he has had bilateral chronic hearing loss for few years.     Impression: New onset episodic vertiginous symptoms likely 2/2 peripheral etiology (ears). Since R sided paresthesias are intermittent and associated with vertigo episodes, lower concern for acute CVA at this time- could also be 2/2 atypical migraine like a vestibular migraine.     Recommendations:   [] CTA head and neck with contrast now- if negative for acute findings, pt can be discharged with further outpatient work up  []MRI brain w and w/o contrast with cuts in IAC to rule out mass lesions and posterior circulation infarcts, central pathology if no resolution of symptoms- can be done outpatient  [] PRN meclizine 25MG PO Q8HR,   [] Orthostatics  []May teach epley manuever for vertigo.  []F/U with neurootologist outpatient, if vertigo found to be of peripheral etiology  []Vestibular therapy outpatient, if vertigo found to be of peripheral etiology    Patient can follow up with Dr. Alamo  after discharge. Please instruct the patient to call [phone] to schedule an appointment. Office is located at [address] [facility], [place] [address].     Case discussed with stroke fellow Elias Zhu under supervision of neurology attending Dr. Muniz.

## 2024-04-25 NOTE — CONSULT NOTE ADULT - SUBJECTIVE AND OBJECTIVE BOX
Neurology - Consult Note    -  Spectra: [phone] ([facility]), [phone] ([facility])  -    HPI:  78-year-old male with PMH cataracts, hypertension, hyperlipidemia, CKD presents with 2 weeks of intermittent dizziness described as lightheadedness associated with right-sided facial tingling down to his right arm.  This happens 2-3 times a day when patient is standing urinating or when patient is standing sitting and reading his book.  Patient usually has the symptoms when he is looking down.  Patient saw his cardiologist and had a Zio patch that was sent in this weekend but did not get the result get the results back yet.  Patient had a normal stress test last month.  Patient was told by his cardiologist to come to the emergency department if this keeps happening or if he gets worse.  Patient states that he had an episode this morning when he was looking down on his cell phone that felt worse.  When patient feels dizziness he feels like he is off balance as well.  This lasted for 30 seconds at a time.  Patient denies fevers, chills, chest pain, shortness of breath, cough, congestion, rhinorrhea, nausea, vomiting, abdominal pain, dysuria, constipation, diarrhea    Review of Systems:  CONSTITUTIONAL: No fevers or chills  NEUROLOGICAL: +As stated in HPI above  SKIN: No itching, burning, rashes, or lesions   All other review of systems is negative unless indicated above.    Allergies:  No Known Allergies      PMHx/PSHx/Family Hx: As above, otherwise see below   HTN (hypertension)    HLD (hyperlipidemia)    CKD (chronic kidney disease)    H/O cataract        Social Hx:  No current use of tobacco, alcohol, or illicit drugs      Medications:  MEDICATIONS  (STANDING):    MEDICATIONS  (PRN):      Vitals:  T(C): 36.7 (04-25-24 @ 15:34), Max: 36.7 (04-25-24 @ 15:34)  HR: 78 (04-25-24 @ 15:34) (66 - 78)  BP: 143/80 (04-25-24 @ 15:34) (143/80 - 155/83)  RR: 20 (04-25-24 @ 15:34) (16 - 20)  SpO2: 100% (04-25-24 @ 15:34) (96% - 100%)    Physical Examination:   General - NAD  Cardiovascular - Peripheral pulses palpable, no edema  Eyes - Fundoscopy with flat, sharp optic discs and no hemorrhage or exudates; Fundoscopy not well visualized; Fundoscopy not performed due to safety precautions in the setting of the COVID-19 pandemic    Neurologic Exam:  Mental status - Awake, Alert, Oriented to person, place, and time. Speech fluent, repetition and naming intact. Follows simple and complex commands. Attention/concentration, recent and remote memory (including registration and recall), and fund of knowledge intact    Cranial nerves - PERRLA, VFF, EOMI, face sensation (V1-V3) intact b/l, facial strength intact without asymmetry b/l, hearing intact b/l, palate with symmetric elevation, trapezius OR sternocleidomastiod 5/5 strength b/l, tongue midline on protrusion with full lateral movement    Motor - Normal bulk and tone throughout. No pronator drift.  Strength testing            Deltoid      Biceps      Triceps     Wrist Extension    Wrist Flexion     Interossei         R            5                 5               5                     5                              5                        5                 5  L             5                 5               5                     5                              5                        5                 5              Hip Flexion    Hip Extension    Knee Flexion    Knee Extension    Dorsiflexion    Plantar Flexion  R              5                           5                       5                           5                            5                          5  L              5                           5                        5                           5                            5                          5    Sensation - Light touch/temperature OR pain/vibration intact throughout    DTR's -             Biceps      Triceps     Brachioradialis      Patellar    Ankle    Toes/plantar response  R             2+             2+                  2+                       2+            2+                 Down  L              2+             2+                 2+                        2+           2+                 Down    Coordination - Finger to Nose intact b/l. No tremors appreciated    Gait and station - Normal casual gait. Romberg (-)    Labs:                        11.9   7.83  )-----------( 225      ( 25 Apr 2024 12:47 )             34.0     04-25    142  |  108  |  38<H>  ----------------------------<  101<H>  4.7   |  23  |  2.39<H>    Ca    9.7      25 Apr 2024 12:47  Phos  3.9     04-25  Mg     2.4     04-25    TPro  7.0  /  Alb  4.2  /  TBili  0.4  /  DBili  x   /  AST  17  /  ALT  13  /  AlkPhos  57  04-25    CAPILLARY BLOOD GLUCOSE        LIVER FUNCTIONS - ( 25 Apr 2024 12:47 )  Alb: 4.2 g/dL / Pro: 7.0 g/dL / ALK PHOS: 57 U/L / ALT: 13 U/L / AST: 17 U/L / GGT: x               CSF:                  Radiology:  CT Head No Cont:  (25 Apr 2024 15:15)      ACC: [id number] EXAM:  CT BRAIN   ORDERED BY:  CARLOS ALBERTO NIETO     PROCEDURE DATE:  04/25/2024          INTERPRETATION:  Clinical indication: Dizziness. Right face and arm   numbness    Multiple axial sections were performed from the base of skull to vertex   without contrast enhancement. Coronal and sagittal reconstructions were   performed    Parenchymal volume loss and chronic microvessel ischemic changes are   identified.    There is no evidence acute hemorrhage mass or mass effect seen    Evaluation of the osseous structures with the appropriate window appears   unchanged    Bilateral maxillary ethmoid and left frontal sinus mucosal is seen.    Both mastoid and middle ear regions appear clear.    IMPRESSION: No acute hemorrhage, mass or mass effect.    --- End of Report ---   Neurology - Consult Note    -  Spectra: [phone] ([facility]), [phone] ([facility])  -    HPI:  78-year-old male with PMH cataracts, hypertension, hyperlipidemia, CKD presents with 2 weeks of intermittent "dizziness" episodes described as lightheadedness/"on a boat feeling"/ off balance associated with transient right-sided facial tingling down to his RUE, and sometimes to RLE.  This happens 2-3 times a day when patient is standing or when patient is looking down to urinate.  Patient usually has the symptoms when he is looking down.  Patient saw his cardiologist and had a Zio patch that was sent in this weekend but did not get the result get the results back yet. Episodes last for 30 seconds at a time. After episodes pt may get a temporary headache above his right eye. Patient denies any recent n/v, focal weakness, changes in vision/hearing, head trauma, fevers, chills but he has had bilateral chronic hearing loss for few years.     Review of Systems:  CONSTITUTIONAL: No fevers or chills  NEUROLOGICAL: +As stated in HPI above  SKIN: No itching, burning, rashes, or lesions   All other review of systems is negative unless indicated above.    Allergies:  No Known Allergies      PMHx/PSHx/Family Hx: As above, otherwise see below   HTN (hypertension)    HLD (hyperlipidemia)    CKD (chronic kidney disease)    H/O cataract        Social Hx:  No current use of tobacco, alcohol, or illicit drugs      Medications:  MEDICATIONS  (STANDING):    MEDICATIONS  (PRN):      Vitals:  T(C): 36.7 (04-25-24 @ 15:34), Max: 36.7 (04-25-24 @ 15:34)  HR: 78 (04-25-24 @ 15:34) (66 - 78)  BP: 143/80 (04-25-24 @ 15:34) (143/80 - 155/83)  RR: 20 (04-25-24 @ 15:34) (16 - 20)  SpO2: 100% (04-25-24 @ 15:34) (96% - 100%)    Physical Examination:   General - NAD  Cardiovascular - Peripheral pulses palpable, no edema  Eyes - Fundoscopy not performed due to safety precautions in the setting of the COVID-19 pandemic    Neurologic Exam:  Mental status - Awake, Alert, Oriented to person, place, and time. Speech fluent, repetition and naming intact. Follows simple and complex commands. Attention/concentration, recent and remote memory (including registration and recall), and fund of knowledge intact    Cranial nerves - PERRLA, VFF, EOMI, face sensation (V1-V3) intact b/l, facial strength intact without asymmetry b/l, hearing intact b/l, palate with symmetric elevation, trapezius  5/5 strength b/l, tongue midline on protrusion with full lateral movement    4-5 beats of nystagmus to the Right.  [place] Hallpike negative.     Motor - Normal bulk and tone throughout. No pronator drift.  Strength testing            Deltoid      Biceps      Triceps     Wrist Extension    Wrist Flexion     Interossei         R            5                 5               5                     5                              5                        5                 5  L             5                 5               5                     5                              5                        5                 5              Hip Flexion    Hip Extension    Knee Flexion    Knee Extension    Dorsiflexion    Plantar Flexion  R              5                           5                       5                           5                            5                          5  L              5                           5                        5                           5                            5                          5    Sensation - Light touch/temperature intact throughout    DTR's -             Biceps      Triceps     Brachioradialis      Patellar    Ankle    Toes/plantar response  R             2+             2+                  2+                       2+            2+                 Down  L              2+             2+                 2+                        2+           2+                 Down    Coordination - Finger to Nose intact b/l. No tremors appreciated    Gait and station - Normal casual gait. Romberg (-)    Labs:                        11.9   7.83  )-----------( 225      ( 25 Apr 2024 12:47 )             34.0     04-25    142  |  108  |  38<H>  ----------------------------<  101<H>  4.7   |  23  |  2.39<H>    Ca    9.7      25 Apr 2024 12:47  Phos  3.9     04-25  Mg     2.4     04-25    TPro  7.0  /  Alb  4.2  /  TBili  0.4  /  DBili  x   /  AST  17  /  ALT  13  /  AlkPhos  57  04-25    CAPILLARY BLOOD GLUCOSE        LIVER FUNCTIONS - ( 25 Apr 2024 12:47 )  Alb: 4.2 g/dL / Pro: 7.0 g/dL / ALK PHOS: 57 U/L / ALT: 13 U/L / AST: 17 U/L / GGT: x               CSF:                  Radiology:  CT Head No Cont:  (25 Apr 2024 15:15)      ACC: [id number] EXAM:  CT BRAIN   ORDERED BY:  CARLOS ALBERTO NIETO     PROCEDURE DATE:  04/25/2024          INTERPRETATION:  Clinical indication: Dizziness. Right face and arm   numbness    Multiple axial sections were performed from the base of skull to vertex   without contrast enhancement. Coronal and sagittal reconstructions were   performed    Parenchymal volume loss and chronic microvessel ischemic changes are   identified.    There is no evidence acute hemorrhage mass or mass effect seen    Evaluation of the osseous structures with the appropriate window appears   unchanged    Bilateral maxillary ethmoid and left frontal sinus mucosal is seen.    Both mastoid and middle ear regions appear clear.    IMPRESSION: No acute hemorrhage, mass or mass effect.    --- End of Report ---

## 2024-04-29 PROBLEM — N18.9 CHRONIC KIDNEY DISEASE, UNSPECIFIED: Chronic | Status: ACTIVE | Noted: 2024-04-25

## 2024-04-29 PROBLEM — Z86.69 PERSONAL HISTORY OF OTHER DISEASES OF THE NERVOUS SYSTEM AND SENSE ORGANS: Chronic | Status: ACTIVE | Noted: 2024-04-25

## 2024-04-29 PROBLEM — I10 ESSENTIAL (PRIMARY) HYPERTENSION: Chronic | Status: ACTIVE | Noted: 2024-04-25

## 2024-04-29 PROBLEM — E78.5 HYPERLIPIDEMIA, UNSPECIFIED: Chronic | Status: ACTIVE | Noted: 2024-04-25

## 2024-04-30 ENCOUNTER — APPOINTMENT (OUTPATIENT)
Dept: OTOLARYNGOLOGY | Facility: CLINIC | Age: 79
End: 2024-04-30
Payer: MEDICARE

## 2024-04-30 VITALS
BODY MASS INDEX: 30.31 KG/M2 | HEART RATE: 72 BPM | DIASTOLIC BLOOD PRESSURE: 85 MMHG | SYSTOLIC BLOOD PRESSURE: 134 MMHG | HEIGHT: 68 IN | WEIGHT: 200 LBS

## 2024-04-30 DIAGNOSIS — J31.0 CHRONIC RHINITIS: ICD-10-CM

## 2024-04-30 DIAGNOSIS — R29.818 OTHER SYMPTOMS AND SIGNS INVOLVING THE NERVOUS SYSTEM: ICD-10-CM

## 2024-04-30 DIAGNOSIS — J34.2 DEVIATED NASAL SEPTUM: ICD-10-CM

## 2024-04-30 DIAGNOSIS — H90.3 SENSORINEURAL HEARING LOSS, BILATERAL: ICD-10-CM

## 2024-04-30 DIAGNOSIS — K13.79 OTHER LESIONS OF ORAL MUCOSA: ICD-10-CM

## 2024-04-30 PROCEDURE — 99204 OFFICE O/P NEW MOD 45 MIN: CPT

## 2024-04-30 PROCEDURE — 92550 TYMPANOMETRY & REFLEX THRESH: CPT

## 2024-04-30 PROCEDURE — 92557 COMPREHENSIVE HEARING TEST: CPT

## 2024-04-30 RX ORDER — MECLIZINE HYDROCHLORIDE 25 MG/1
25 TABLET ORAL 3 TIMES DAILY
Qty: 30 | Refills: 5 | Status: ACTIVE | COMMUNITY
Start: 2024-04-30 | End: 1900-01-01

## 2024-04-30 NOTE — HISTORY OF PRESENT ILLNESS
[de-identified] : Patient presents today stating that he has been experiencing dizziness for the past 2-3 weeks. He states that this dizziness occurs mostly when he stands up and he looks down. He denies tinnitus. He denies difficulty hearing. He denies a clogged sensation in his ears. He denies snoring at night.

## 2024-04-30 NOTE — PHYSICAL EXAM
[Hearing Watson Test (Tuning Fork On Forehead)] : no lateralization of tone [Midline] : trachea located in midline position [Normal] : orientation to person, place, and time: normal [de-identified] : mild TMJ [Hearing Loss Right Only] : normal [Hearing Loss Left Only] : normal [FreeTextEntry9] :  cerumen removed via curettage [de-identified] :  mildly inflamed turbinates [de-identified] : mild edema of the uvula. type 3 oral cavity [] : Romberg test is negative [de-identified] : unsteady falling backwards

## 2024-04-30 NOTE — DATA REVIEWED
[de-identified] : Audio 4/30/24: -Type A Tymps AU -Hearing WNL sloping to a mod-severe/severe SNHL 250-8000 Hz AU -88% understanding in the right ear at 60 dB -92% understanding in the left ear at 60 dB [de-identified] : CT angio brain 4/25/24: -compatible with age -some microvascular changes -no masses -thickening throughout the sinuses -middle ear normal -mastoid normal -previous cataracts surgery  CT angio neck 4/25/24: -grossly unremarkable -plaque with less than 50% stenosis in bilateral carotid arteries -degenerative changes to the spine

## 2024-04-30 NOTE — ASSESSMENT
[FreeTextEntry1] : Reviewed and reconciled medications, allergies, PMHx, PSHx, SocHx, FMHx.  Patient presents today stating that he has been experiencing dizziness for the past 2-3 weeks. He states that this dizziness occurs mostly when he stands up and he looks down. He denies tinnitus. He denies difficulty hearing. He denies a clogged sensation in his ears. He denies snoring at night.  CT angio brain 4/25/24: -compatible with age -some microvascular changes -no masses -thickening throughout the sinuses -middle ear normal -mastoid normal -previous cataracts surgery  CT angio neck 4/25/24: -grossly unremarkable -plaque with less than 50% stenosis in bilateral carotid arteries -degenerative changes to the spine   Physical exam: -left ear canal: cerumen removed via curettage -no lateralization to tuning forks -mildly deviated septum bilaterally -mildly inflamed turbinates -mild edema of the uvula -type 3 oral cavity -mild TMJ -no nystagmus -horizontal head roll: negative -vertical head roll: negative -Romberg: negative, but unsteady falling backwards  Audio 4/30/24: -Type A Tymps AU -Hearing WNL sloping to a mod-severe/severe SNHL 250-8000 Hz AU -88% understanding in the right ear at 60 dB -92% understanding in the left ear at 60 dB   Plan:  Audio - results interpreted by Dr. Parks and reviewed with the patient. -Use Meclizine- 1/2 a pill up to 3 times a day (no driving when in use) -Ordered VNG (no Meclizine 3 days before testing) -Discussed future MRI pending results from VNG (hearing loss too severe for an ABR) -FU with results from [facility]

## 2024-04-30 NOTE — CONSULT LETTER
[Dear  ___] : Dear  [unfilled], [Consult Letter:] : I had the pleasure of evaluating your patient, [unfilled]. [Please see my note below.] : Please see my note below. [Consult Closing:] : Thank you very much for allowing me to participate in the care of this patient.  If you have any questions, please do not hesitate to contact me. [Sincerely,] : Sincerely, [FreeTextEntry3] :  Damián Parks MD FACS

## 2024-04-30 NOTE — ADDENDUM
[FreeTextEntry1] :  Documented by Miky Ronquillo acting as scribe for Dr. Parks on 04/30/2024. All Medical record entries made by the Scribe were at my, Dr. Parks, direction and personally dictated by me on 04/30/2024 . I have reviewed the chart and agree that the record accurately reflects my personal performance of the history, physical exam, assessment and plan. I have also personally directed, reviewed, and agreed with the discharge instructions.

## 2024-05-02 ENCOUNTER — APPOINTMENT (OUTPATIENT)
Dept: CARDIOLOGY | Facility: CLINIC | Age: 79
End: 2024-05-02
Payer: MEDICARE

## 2024-05-02 ENCOUNTER — NON-APPOINTMENT (OUTPATIENT)
Age: 79
End: 2024-05-02

## 2024-05-02 VITALS
DIASTOLIC BLOOD PRESSURE: 81 MMHG | OXYGEN SATURATION: 95 % | HEIGHT: 68 IN | HEART RATE: 64 BPM | BODY MASS INDEX: 30.46 KG/M2 | WEIGHT: 201 LBS | TEMPERATURE: 98.2 F | SYSTOLIC BLOOD PRESSURE: 159 MMHG

## 2024-05-02 DIAGNOSIS — I25.10 ATHEROSCLEROTIC HEART DISEASE OF NATIVE CORONARY ARTERY W/OUT ANGINA PECTORIS: ICD-10-CM

## 2024-05-02 PROCEDURE — 93000 ELECTROCARDIOGRAM COMPLETE: CPT

## 2024-05-02 PROCEDURE — 99215 OFFICE O/P EST HI 40 MIN: CPT

## 2024-05-02 PROCEDURE — G2211 COMPLEX E/M VISIT ADD ON: CPT

## 2024-05-02 RX ORDER — METOPROLOL SUCCINATE 25 MG/1
25 TABLET, EXTENDED RELEASE ORAL DAILY
Qty: 90 | Refills: 1 | Status: DISCONTINUED | COMMUNITY
Start: 2023-12-08 | End: 2024-05-02

## 2024-05-02 NOTE — HISTORY OF PRESENT ILLNESS
[FreeTextEntry1] : 78M w HTN CKD presents for f/u Sent in by PMD: Dr Alonzo Erazo: Dr Rosa Ho  Previously, pt seen 11/23 for an initial eval, +MOJICA. TTE showing preserved LV EF mod MR TR normal RV. pharm nuc stress showing medium severe fixed defects last seen 3/24, feeling well. did not tolerate toprol 2/2 pre-syncope. stopped it. on asa. statin started.   pt now presents for follow up. today,  pt with recent hospitalization at [facility] on 4/25, for dizziness. pt had a ct head, cta head and neck, was told nothing to do acutely, and told to follow up with cardio and ENT. started on meclizine. plans for further ent w/u. pt states he only gets dizzy if he stands up and then looks down. can occur with urination when standing up and looking into toilet.   no cp or sob at rest or on exertion. Denies palpitations, dizziness, diaphoresis, syncope.  no recent falls.  Exercise: walking no issues. Diet: none  Prev cardiac history: none Previous cardiac testing: stress test 4 years ago, normal per pt. Recent labs:   Med hx: HTN CKD Sx hx: eye sx for glaucoma Family hx: no known cardiac hx Social hx: lives in [place], with fiance x20 years. prev . retired . never tob. former etoh, quit 30 yrs ago. no drugs Meds: flomax norvasc 10 asa 81 montelukast lipitor Allergies: nkda

## 2024-05-02 NOTE — REVIEW OF SYSTEMS
[Fever] : no fever [Headache] : no headache [Weight Gain (___ Lbs)] : no recent weight gain [Chills] : no chills [Feeling Fatigued] : not feeling fatigued [Weight Loss (___ Lbs)] : no recent weight loss [Blurry Vision] : no blurred vision [Sore Throat] : no sore throat [SOB] : shortness of breath [Dyspnea on exertion] : not dyspnea during exertion [Chest Discomfort] : no chest discomfort [Lower Ext Edema] : no extremity edema [Orthopnea] : no orthopnea [Syncope] : no syncope [Cough] : no cough [Wheezing] : no wheezing [Nausea] : no nausea [Vomiting] : no vomiting [Dizziness] : dizziness [Confusion] : no confusion was observed [Easy Bleeding] : no tendency for easy bleeding [Easy Bruising] : no tendency for easy bruising [de-identified] : see hpi

## 2024-05-02 NOTE — DISCUSSION/SUMMARY
[FreeTextEntry1] : 78M w HTN CKD presents for follow up  CAD -MOJICA resolved -nuc stress reviewed, fixed defects -on asa, lipitor -pt unable to tolerate bb tx, not interested in trying again   dizziness -no syncope -positional -ent w/u in progress -lower suspicion for primary cardiac etiolgy here -discussed joséo, pt to consider it -carotid fr4om 12/23 reviewed  f/u 2 months unless requires sooner 40 min spent on complete encounter  [EKG obtained to assist in diagnosis and management of assessed problem(s)] : EKG obtained to assist in diagnosis and management of assessed problem(s)

## 2024-05-03 ENCOUNTER — APPOINTMENT (OUTPATIENT)
Dept: OTOLARYNGOLOGY | Facility: CLINIC | Age: 79
End: 2024-05-03

## 2024-05-08 PROCEDURE — 93244 EXT ECG>48HR<7D REV&INTERPJ: CPT

## 2024-05-16 ENCOUNTER — APPOINTMENT (OUTPATIENT)
Dept: PULMONOLOGY | Facility: CLINIC | Age: 79
End: 2024-05-16
Payer: MEDICARE

## 2024-05-16 VITALS
HEART RATE: 67 BPM | WEIGHT: 203.25 LBS | SYSTOLIC BLOOD PRESSURE: 127 MMHG | BODY MASS INDEX: 30.8 KG/M2 | HEIGHT: 68 IN | TEMPERATURE: 97.9 F | DIASTOLIC BLOOD PRESSURE: 69 MMHG | RESPIRATION RATE: 15 BRPM | OXYGEN SATURATION: 96 %

## 2024-05-16 PROCEDURE — 99214 OFFICE O/P EST MOD 30 MIN: CPT

## 2024-05-16 NOTE — HISTORY OF PRESENT ILLNESS
[Never] : never [TextBox_4] : Mr. VANGIE LOERA is a 78 year old man never smoker w asthma is here for f/u.  History: Developed acute bronchitis April/May 2023. Denies respiratory issues prior to this.  Patient reports a 3 months history of wheezing - dry coughing - treated with antibiotics and albuterol - he improved and then two weeks later his symptoms recurred and he was treated with prednisone with improvement and then recurrence and on his last visit he was then treated with Symbicort 80 BID and he improved.  Was seen by Dr Boxer - dose of Symbicort was increased. Today, he reports persistent MOJICA when walking up the stairs. He is no longer wheezing/coughing. He has been noticing SOB for the last 1-2 months  Interval Events: Has been doing really well on low dose Symbicort and Singulair reports compliance.  Rare albuterol use.   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  FH: father  of lung ca at 79 (smoker) PMD: ALENA DAWKNIS

## 2024-05-16 NOTE — ASSESSMENT
[FreeTextEntry1] : 78-year-old never smoker with moderate persistent asthma here for follow-up  #Asthma - Currently doing well on lowdose Symbicort. Pulmonary function testing Oct 2023 w no evidence of obstruction, moderate restriction, normal DLCO.  Suggestion of bronchodilator response not meeting ATS criteria. Prior blood work with evidence of peripheral eosinophilia.  Chest x-ray In June 2023 with clear lungs -- c/w low dose Symbicort -- Continue Singulair -- Repeat PFTs  #HCM: Immunizations: had flu vaccine 2023 season; Pneumococcal vaccine approx 2019  All questions answered. Patient in agreement with plan.  Follow up in 4-5 mo or sooner if needed.

## 2024-05-16 NOTE — CONSULT LETTER
[Dear  ___] : Dear  [unfilled], [Consult Letter:] : I had the pleasure of evaluating your patient, [unfilled]. [Please see my note below.] : Please see my note below. [Consult Closing:] : Thank you very much for allowing me to participate in the care of this patient.  If you have any questions, please do not hesitate to contact me. [FreeTextEntry3] : Sincerely,  Rosa Ho MD [facility] tel: [phone] fax: [phone]

## 2024-05-20 ENCOUNTER — APPOINTMENT (OUTPATIENT)
Dept: PULMONOLOGY | Facility: CLINIC | Age: 79
End: 2024-05-20
Payer: MEDICARE

## 2024-05-20 PROCEDURE — 94727 GAS DIL/WSHOT DETER LNG VOL: CPT

## 2024-05-20 PROCEDURE — 94729 DIFFUSING CAPACITY: CPT

## 2024-05-20 PROCEDURE — 94060 EVALUATION OF WHEEZING: CPT

## 2024-05-20 RX ORDER — BUDESONIDE AND FORMOTEROL FUMARATE DIHYDRATE 80; 4.5 UG/1; UG/1
80-4.5 AEROSOL RESPIRATORY (INHALATION) TWICE DAILY
Qty: 3 | Refills: 1 | Status: DISCONTINUED | COMMUNITY
Start: 2023-10-17 | End: 2024-05-20

## 2024-05-21 RX ORDER — BUDESONIDE AND FORMOTEROL FUMARATE DIHYDRATE 160; 4.5 UG/1; UG/1
160-4.5 AEROSOL RESPIRATORY (INHALATION)
Qty: 3 | Refills: 0 | Status: DISCONTINUED | COMMUNITY
Start: 2023-10-25 | End: 2024-05-21

## 2024-05-22 RX ORDER — TAMSULOSIN HYDROCHLORIDE 0.4 MG/1
0.4 CAPSULE ORAL
Qty: 90 | Refills: 1 | Status: ACTIVE | COMMUNITY

## 2024-05-22 RX ORDER — FLUTICASONE FUROATE AND VILANTEROL TRIFENATATE 100; 25 UG/1; UG/1
100-25 POWDER RESPIRATORY (INHALATION)
Qty: 3 | Refills: 1 | Status: ACTIVE | COMMUNITY
Start: 2024-05-21 | End: 1900-01-01

## 2024-05-22 RX ORDER — AMLODIPINE BESYLATE 10 MG/1
10 TABLET ORAL DAILY
Qty: 90 | Refills: 1 | Status: ACTIVE | COMMUNITY

## 2024-05-23 RX ORDER — LOSARTAN POTASSIUM 25 MG/1
25 TABLET, FILM COATED ORAL DAILY
Qty: 90 | Refills: 1 | Status: ACTIVE | COMMUNITY

## 2024-05-24 ENCOUNTER — NON-APPOINTMENT (OUTPATIENT)
Age: 79
End: 2024-05-24

## 2024-05-24 ENCOUNTER — APPOINTMENT (OUTPATIENT)
Dept: ELECTROPHYSIOLOGY | Facility: CLINIC | Age: 79
End: 2024-05-24
Payer: MEDICARE

## 2024-05-24 VITALS — SYSTOLIC BLOOD PRESSURE: 160 MMHG | HEART RATE: 64 BPM | OXYGEN SATURATION: 97 % | DIASTOLIC BLOOD PRESSURE: 84 MMHG

## 2024-05-24 PROCEDURE — 99214 OFFICE O/P EST MOD 30 MIN: CPT

## 2024-05-24 PROCEDURE — 93000 ELECTROCARDIOGRAM COMPLETE: CPT

## 2024-05-24 PROCEDURE — 99204 OFFICE O/P NEW MOD 45 MIN: CPT

## 2024-05-24 RX ORDER — MONTELUKAST 10 MG/1
10 TABLET, FILM COATED ORAL
Qty: 30 | Refills: 3 | Status: DISCONTINUED | COMMUNITY
Start: 2023-09-27 | End: 2024-05-24

## 2024-05-24 NOTE — CARDIOLOGY SUMMARY
[de-identified] : ECG from 5/24/2024: Sinus rhythm with a first-degree AV delay (NE: 234ms), single PVC, left axis deviation [de-identified] : Ten from 4/17/2024-4/20/2024 (personally reviewed): min HR: 56, max HR: 169, mean HR: 71, 15 patient triggered events - sinus rhythm, sinus rhythm with APCs and sinus rhythm with PVCs / 1 episode of NSVT not associated with a patient triggered event. APC burden < 1%, PVC burden 2.8% (daily range 2.7-3.1%), 32 episodes of pSVT [de-identified] : Pharmacologic Nuclear Stress Test from 12/7/2023: medium-sized, severe defects in the inferoapical and apicolateral walls consistent with infarcts. small-sized, moderate fixed defects in the anteroseptal wall that predominantly corrects with prone imagine and demonstrates normal wall motion, suggestive of attenuation artifact. EF: 57%, hypokinesis of the apical wall.  [de-identified] : TTE from 12/5/2023: EF: 55%, concentric LVH, grade I diastolic dysfunction, mod MR, mod TR, normal RV size and systolic function, no pericardial effusion

## 2024-05-24 NOTE — DISCUSSION/SUMMARY
[FreeTextEntry1] : Mr. Shaheed Sanchez is a 78-year-old man with hypertension, asthma and chronic kidney disease. He presents today for an initial evaluation of dizziness. My impression is that his symptoms are not related to an arrhythmia. His symptom triggered events on the Zio monitor did not correlate with the single episode of non-sustained ventricular tachycardia or the episodes of non-sustained supraventricular tachycardia that were captured on the monitor. As his symptoms persist for one minute, an isolated premature ventricular contraction is unlikely to be the cause of his symptoms.  I recommended he continue to follow with Dr. Laguerre and see me again as needed. Regarding the Metoprolol, his indication was infarcts on a nuclear stress test. I do not believe that Metoprolol should exacerbate his symptoms (and may in fact help with his non-sustained arrhythmias). All questions answered. [EKG obtained to assist in diagnosis and management of assessed problem(s)] : EKG obtained to assist in diagnosis and management of assessed problem(s)

## 2024-05-24 NOTE — PHYSICAL EXAM
[Well Developed] : well developed [Well Nourished] : well nourished [No Acute Distress] : no acute distress [Normal Venous Pressure] : normal venous pressure [Normal S1, S2] : normal S1, S2 [No Murmur] : no murmur [No Rub] : no rub [No Gallop] : no gallop [Clear Lung Fields] : clear lung fields [Good Air Entry] : good air entry [No Respiratory Distress] : no respiratory distress  [Soft] : abdomen soft [Non Tender] : non-tender [Normal Gait] : normal gait [No Edema] : no edema [No Clubbing] : no clubbing [No Rash] : no rash [Moves all extremities] : moves all extremities [No Focal Deficits] : no focal deficits [Normal Speech] : normal speech [Alert and Oriented] : alert and oriented [Normal memory] : normal memory

## 2024-05-24 NOTE — HISTORY OF PRESENT ILLNESS
[FreeTextEntry1] : Mr. Shaheed Sanchez is a 78-year-old man with hypertension, asthma and chronic kidney disease. He presents today for an initial evaluation.  The patient was treated with Metoprolol and developed episodes of dizziness around the same time that this medication was initiated. The indication for the Metoprolol was fixed defects on a nuclear stress test. The dizziness is associated with imbalance and a sensation of warmth on the right side of his face. The episodes occur while he is standing and also when he changes his position quickly. The symptoms last between 30 seconds and one minute. He presented to the Emergency Department at [facility] in March of 2024 with dizziness despite discontinuation of Metoprolol. He was evaluated by Neurology and was treated with Meclizine. He has not noticed a significant improvement with Meclizine. He wore a Zio monitor April 17th, 2024 through April 20th, 2024. This demonstrated that his patient triggered events correlated with sinus rhythm, sinus rhythm with premature atrial contractions and sinus rhythm with premature ventricular contractions. His overall burden of premature ventricular contractions was 2.8%. He had a single episode of non-sustained ventricular tachycardia (6 beats) and 32 episodes of a paroxysmal supraventricular tachycardia (longest 11 seconds). No reports of chest pain, shortness of breath, palpitations, pre-syncope or syncope.

## 2024-06-05 ENCOUNTER — APPOINTMENT (OUTPATIENT)
Dept: NEUROLOGY | Facility: CLINIC | Age: 79
End: 2024-06-05
Payer: MEDICARE

## 2024-06-05 VITALS
DIASTOLIC BLOOD PRESSURE: 68 MMHG | OXYGEN SATURATION: 98 % | BODY MASS INDEX: 30.53 KG/M2 | TEMPERATURE: 98.1 F | WEIGHT: 201.44 LBS | HEART RATE: 68 BPM | HEIGHT: 68 IN | RESPIRATION RATE: 15 BRPM | SYSTOLIC BLOOD PRESSURE: 133 MMHG

## 2024-06-05 PROCEDURE — 99205 OFFICE O/P NEW HI 60 MIN: CPT

## 2024-06-05 NOTE — PHYSICAL EXAM
[FreeTextEntry1] : PHYSICAL EXAM Constitutional: Alert, no acute distress  Psychiatric: appropriate affect and mood Pulmonary: No respiratory distress, stable on room air  NEUROLOGICAL EXAM Mental status: The patient is alert, attentive and conversational memory intact. Speech/language: No dysarthria Cranial nerves: CN II: Visual fields are full to confrontation. Pupil size equal and briskly reactive to light.  CN III, IV, VI: EOMI, no nystagmus, no ptosis CN V: Facial sensation is intact to pinprick in all 3 divisions bilaterally. CN VII: Face is symmetric with normal eye closure and smile. CN VII: Hearing is normal to rubbing fingers CN IX, X: Palate elevates symmetrically.  CN XI: Head turning and shoulder shrug are intact CN XII: Tongue is midline with normal movements and no atrophy. Motor: Strength is full bilaterally. 5/5 muscle power in bilateral UE and LE. Reflexes: R        L  Biceps    2+       2+  Patellar   1+       1+  Achilles  0       0 Plantar responses- R down, L down Sensory:  LT sensation intact UE and LE Coordination/Cerebellar: There is no dysmetria on finger-to-nose and heel to shin.  Gait/Stance: Posture is normal. Gait is steady with normal steps, base, arm swing, and turning. Tandem gait with mild unsteadiness. Romberg with mild sway backwards.  Orthostatic vitals: Lyin/83, HR 65 Sittin/76, HR 65 Standin/79, HR 69

## 2024-06-05 NOTE — ASSESSMENT
[FreeTextEntry1] : Assessment/Plan:  78 year old male w/ new onset episodic vertigo since 4/2024-   # Vertigo: unclear etiology of symptoms. The episodic, brief and positional features of the symptoms seem most c/w BPPV, however the right sided headaches and facial flushing/tingling would be atypical for BPPV. Presentation less likely ischemic in etiology. The ? PCOM aneurysm and focal narrowing of left P2 PCA are likely non specific and incidental findings. Other peripheral vestibular disorders, like Meniere disease, could still be on the differential.  # Right sided headache with facial flushing: ? trigeminal autonomic cephalgia.   At this time, given new onset vertiginous sensations with headaches, I would like to rule out intracranial pathology.   Plan:- -MR brain and IAC w/o contrast (Hx of CKD) to rule out structural pathology -Referral to Dr Kody Staton for ? PCOM aneurysm (? tiny 1-2 mm right PCOM origin infundibulum versus saccular aneurysm)- this is likely an incidental finding.  -Continue to follow with ENT and Cardiology -Avoid standing up too fast or making rapid head movements  Return to clinic 1-2 months  The above plan was discussed with VANGIE LOERA in great detail.  VANGIE LOERA verbalized understanding and agrees with plan as detailed above. Patient was provided education and counselling on current diagnosis/symptoms. He was advised to call our clinic at [phone] for any new or worsening symptoms, or with any questions or concerns. VANGIE LOERA expressed understanding and all his questions/concerns were addressed.  Shanel Muniz M.D

## 2024-06-05 NOTE — DATA REVIEWED
[de-identified] : CTH and CTA H/N 4/2024- CT HEAD: No acute intracranial hemorrhage or mass effect. Chronic changes-volume loss and microvascular ischemic changes. CTA HEAD: No large vessel occlusion. Severe atherosclerotic narrowing of the supraclinoid ICA segments bilaterally. Irregularity with moderate focal narrowing of the left P2 PCA. Question tiny 1-2 mm right PCOM origin infundibulum versus saccular aneurysm. CTA NECK: Atherosclerotic changes at both carotid bifurcations with less than 50% stenosis at the ICA origin/proximal segments bilaterally.

## 2024-06-05 NOTE — HISTORY OF PRESENT ILLNESS
[FreeTextEntry1] : HPI (initial visit Jun 05, 2024)- VANGIE LOERA is a 78 year old man w/ HTN, Asthma, CKD, referred for dizziness.   Pt seen by cardiology and ENT.  Dizziness since April 2024.  "Like rocking on a boat". No room spinning sensation. Associated with flushing/tingling of right side of face and right sided headache that lasts < 1 hour- occasionally needs to take Tylenol (HA does not occur every time). + nausea w/ headaches. No prior hx of headaches.  He stopped metoprolol but this did not help with dizziness.  No facial droop, vision changes, speech changes, extremity weakness/numbness. No redness of eye or nasal congestion.  Chronic eye tearing b/l from allergies.  Dizziness occurs when standing and looking down. Lasts < 1 min. Happens when getting up too fast. No syncopal events. Had 1 fall- loss his balance.  Seen in ED 4/2024.  Trialed meclizine- did not help. Occasional tinnitus in right ear (new since 4/2024). No ear fullness. Wears hearing aids.  Cards work up unrevealing. Had Zio monitor with sinus rhythm during triggered events. Per cards, less likely primary cardiac etiology.  No chest pain or SOB. NO palpitations.  VNG/ENG pending.    CTH and CTA H/N 4/2024- CT HEAD: No acute intracranial hemorrhage or mass effect. Chronic changes-volume loss and microvascular ischemic changes. CTA HEAD: No large vessel occlusion. Severe atherosclerotic narrowing of the supraclinoid ICA segments bilaterally. Irregularity with moderate focal narrowing of the left P2 PCA. Question tiny 1-2 mm right PCOM origin infundibulum versus saccular aneurysm. CTA NECK: Atherosclerotic changes at both carotid bifurcations with less than 50% stenosis at the ICA origin/proximal segments bilaterally.

## 2024-06-06 ENCOUNTER — APPOINTMENT (OUTPATIENT)
Dept: MRI IMAGING | Facility: CLINIC | Age: 79
End: 2024-06-06
Payer: MEDICARE

## 2024-06-06 PROCEDURE — 70551 MRI BRAIN STEM W/O DYE: CPT

## 2024-06-07 ENCOUNTER — RX RENEWAL (OUTPATIENT)
Age: 79
End: 2024-06-07

## 2024-06-07 RX ORDER — ATORVASTATIN CALCIUM 20 MG/1
20 TABLET, FILM COATED ORAL
Qty: 90 | Refills: 0 | Status: ACTIVE | COMMUNITY
Start: 2024-03-11 | End: 1900-01-01

## 2024-06-12 ENCOUNTER — APPOINTMENT (OUTPATIENT)
Dept: NEUROLOGY | Facility: CLINIC | Age: 79
End: 2024-06-12
Payer: MEDICARE

## 2024-06-12 VITALS
BODY MASS INDEX: 30.31 KG/M2 | HEIGHT: 68 IN | WEIGHT: 200 LBS | HEART RATE: 68 BPM | OXYGEN SATURATION: 96 % | DIASTOLIC BLOOD PRESSURE: 80 MMHG | SYSTOLIC BLOOD PRESSURE: 120 MMHG

## 2024-06-12 DIAGNOSIS — R51.9 HEADACHE, UNSPECIFIED: ICD-10-CM

## 2024-06-12 DIAGNOSIS — R42 DIZZINESS AND GIDDINESS: ICD-10-CM

## 2024-06-12 DIAGNOSIS — G31.84 MILD COGNITIVE IMPAIRMENT, SO STATED: ICD-10-CM

## 2024-06-12 PROCEDURE — 99215 OFFICE O/P EST HI 40 MIN: CPT

## 2024-06-12 PROCEDURE — G2211 COMPLEX E/M VISIT ADD ON: CPT

## 2024-06-12 RX ORDER — RIMEGEPANT SULFATE 75 MG/75MG
75 TABLET, ORALLY DISINTEGRATING ORAL
Qty: 1 | Refills: 11 | Status: ACTIVE | COMMUNITY
Start: 2024-06-12 | End: 1900-01-01

## 2024-06-12 NOTE — ASSESSMENT
[FreeTextEntry1] : Assessment/Plan:  78 year old male w/ new onset episodic vertigo since 4/2024-  # Vertigo: unclear etiology of symptoms. The episodic, brief and positional features of the symptoms seem most c/w BPPV, however the accompanied right sided headaches and facial flushing/tingling would be atypical for BPPV. Presentation less likely ischemic in etiology. The ? PCOM aneurysm and focal narrowing of left P2 PCA are likely non specific and incidental findings. Other peripheral vestibular disorders, like Meniere disease, could still be on the differential.MRI brain/IAC with incidental L lateral posterior fossa extra axial mass (1 cm)- likely meningioma (benign, likely incidental finding).   # Right sided headache with facial flushing/tingling: Headaches are proceeded by brief vertigo and R facial tingling, ? Migraine headaches with aura (? brainstem aura).   # Vascular cognitive impairment (stable): MRI brain w/ extensive WM disease.    Plan:- -MR brain w/ contrast to evaluate ? L posterior fossa meningioma. (given hx of CKD, will get clearance from nephrologist- emailed) -Will refer to vestibular therapy -Recommend Magnesium 400 mg QD -Will prescribe Nurtec PRN for headaches. Reviewed s/e. (triptan contraindicated due to ? cerebral aneurysm, hx of HTN and ? brainstem aura) -Referral to Dr Kody Staton for ? PCOM aneurysm (? tiny 1-2 mm right PCOM origin infundibulum versus saccular aneurysm)- this is likely an incidental finding. -Continue to follow with ENT and Cardiology -Avoid standing up too fast or making rapid head movements  Return to clinic 4-6 weeks  Nephrologist: Dr Mohsen Pahlavan, MD  The above plan was discussed with VANGIE LOERA in great detail. VANGIE LOERA verbalized understanding and agrees with plan as detailed above. Patient was provided education and counselling on current diagnosis/symptoms. He was advised to call our clinic at [phone] for any new or worsening symptoms, or with any questions or concerns. VANGIE LOERA expressed understanding and all his questions/concerns were addressed.  Shanel Muniz M.D.

## 2024-06-12 NOTE — DATA REVIEWED
[de-identified] : MR brain and IAC w/o contrast 6/6/24 (personally reviewed)-: 1 cm extra axial mass in left lateral posterior fossa adjacent to cerebellar hemisphere, likely meningioma (this was also seen on CTH 4/2024)- this is not causing any mass effect on cerebellum. No cerebellar signal abnormality.  extensive chronic microvascular ischemic changes. no IAC abnormality. [de-identified] : CTH and CTA H/N 4/2024- CT HEAD: No acute intracranial hemorrhage or mass effect. Chronic changes-volume loss and microvascular ischemic changes. CTA HEAD: No large vessel occlusion. Severe atherosclerotic narrowing of the supraclinoid ICA segments bilaterally. Irregularity with moderate focal narrowing of the left P2 PCA. Question tiny 1-2 mm right PCOM origin infundibulum versus saccular aneurysm. CTA NECK: Atherosclerotic changes at both carotid bifurcations with less than 50% stenosis at the ICA origin/proximal segments bilaterally.

## 2024-06-12 NOTE — PHYSICAL EXAM
[FreeTextEntry1] : PHYSICAL EXAM Constitutional: Alert, no acute distress Psychiatric: appropriate affect and mood Pulmonary: No respiratory distress, stable on room air  NEUROLOGICAL EXAM Mental status: The patient is alert, attentive and conversational memory intact. Speech/language: No dysarthria Cranial nerves: CN II: Visual fields are full to confrontation. Pupil size equal and briskly reactive to light. CN III, IV, VI: EOMI, no nystagmus, no ptosis CN V: Facial sensation is intact to pinprick in all 3 divisions bilaterally. CN VII: Face is symmetric with normal eye closure and smile. CN VII: Hearing is normal to rubbing fingers CN IX, X: Palate elevates symmetrically. CN XI: Head turning and shoulder shrug are intact CN XII: Tongue is midline with normal movements and no atrophy. Motor: Strength is full bilaterally. 5/5 muscle power in bilateral UE and LE. Reflexes: R L  Biceps 2+ 2+  Patellar 1+ 1+  Achilles 0 0 Plantar responses- R down, L down Sensory: LT sensation intact UE and LE Coordination/Cerebellar: There is no dysmetria on finger-to-nose and heel to shin. Gait/Stance: Posture is normal. Gait is steady with normal steps, base, arm swing, and turning. Tandem gait with mild unsteadiness. Romberg with mild sway backwards.  Orthostatic vitals (2024): Lyin/83, HR 65 Sittin/76, HR 65 Standin/79, HR 69.

## 2024-06-12 NOTE — HISTORY OF PRESENT ILLNESS
[FreeTextEntry1] : INTERIM HX 06/12/2024: MR brain and IAC w/o contrast 6/6/24 (personally reviewed)-: 1 cm extra axial mass in left lateral posterior fossa adjacent to cerebellar hemisphere, likely meningioma (this was also seen on CTH 4/2024)- this is not causing any mass effect on cerebellum. No cerebellar signal abnormality.  extensive chronic microvascular ischemic changes. no IAC abnormality. Pt here with partner. He is stable. Continues to get the dizzy episodes followed by R facial tingling and right sided headaches. Can occur a few times in a day. The dizzy spells only occur when standing.  Of note, he has been seen by a neurologist 8-10 years ago and was diagnosed with vascular cognitive impairment and was started on aricept and memantine- pt stopped these. Overall stable short term memory loss.  Hx of alcohol use x 20ish year heavy drinker. Quit in his 30's.   HPI (initial visit Jun 05, 2024)- VANGIE LOERA is a 78 year old man w/ HTN, Asthma, CKD, referred for dizziness.   Pt seen by cardiology and ENT.  Dizziness since April 2024.  "Like rocking on a boat". No room spinning sensation. Associated with flushing/tingling of right side of face and right sided headache that lasts < 1 hour- occasionally needs to take Tylenol (HA does not occur every time). + nausea w/ headaches. No prior hx of headaches.  He stopped metoprolol but this did not help with dizziness. ` No facial droop, vision changes, speech changes, extremity weakness/numbness. No redness of eye or nasal congestion.  Chronic eye tearing b/l from allergies.  Dizziness occurs when standing and looking down. Lasts < 1 min. Happens when getting up too fast. No syncopal events. Had 1 fall- loss his balance.  Seen in ED 4/2024.  Trialed meclizine- did not help. Occasional tinnitus in right ear (new since 4/2024). No ear fullness. Wears hearing aids.  Cards work up unrevealing. Had Zio monitor with sinus rhythm during triggered events. Per cards, less likely primary cardiac etiology.  No chest pain or SOB. NO palpitations.  VNG/ENG pending.    CTH and CTA H/N 4/2024- CT HEAD: No acute intracranial hemorrhage or mass effect. Chronic changes-volume loss and microvascular ischemic changes. CTA HEAD: No large vessel occlusion. Severe atherosclerotic narrowing of the supraclinoid ICA segments bilaterally. Irregularity with moderate focal narrowing of the left P2 PCA. Question tiny 1-2 mm right PCOM origin infundibulum versus saccular aneurysm. CTA NECK: Atherosclerotic changes at both carotid bifurcations with less than 50% stenosis at the ICA origin/proximal segments bilaterally.

## 2024-06-28 ENCOUNTER — APPOINTMENT (OUTPATIENT)
Dept: OTOLARYNGOLOGY | Facility: CLINIC | Age: 79
End: 2024-06-28
Payer: MEDICARE

## 2024-06-28 PROCEDURE — 92537 CALORIC VSTBLR TEST W/REC: CPT

## 2024-06-28 PROCEDURE — 92540 BASIC VESTIBULAR EVALUATION: CPT

## 2024-07-02 ENCOUNTER — APPOINTMENT (OUTPATIENT)
Dept: OTOLARYNGOLOGY | Facility: CLINIC | Age: 79
End: 2024-07-02

## 2024-07-05 ENCOUNTER — APPOINTMENT (OUTPATIENT)
Dept: CARDIOLOGY | Facility: CLINIC | Age: 79
End: 2024-07-05
Payer: MEDICARE

## 2024-07-05 VITALS
BODY MASS INDEX: 29.57 KG/M2 | TEMPERATURE: 97.9 F | SYSTOLIC BLOOD PRESSURE: 123 MMHG | OXYGEN SATURATION: 94 % | WEIGHT: 195.13 LBS | DIASTOLIC BLOOD PRESSURE: 74 MMHG | HEART RATE: 67 BPM | HEIGHT: 68 IN

## 2024-07-05 PROCEDURE — G2211 COMPLEX E/M VISIT ADD ON: CPT

## 2024-07-05 PROCEDURE — 99215 OFFICE O/P EST HI 40 MIN: CPT

## 2024-07-10 ENCOUNTER — APPOINTMENT (OUTPATIENT)
Dept: NEUROLOGY | Facility: CLINIC | Age: 79
End: 2024-07-10
Payer: MEDICARE

## 2024-07-10 VITALS
HEART RATE: 70 BPM | OXYGEN SATURATION: 99 % | SYSTOLIC BLOOD PRESSURE: 124 MMHG | BODY MASS INDEX: 29.55 KG/M2 | DIASTOLIC BLOOD PRESSURE: 72 MMHG | WEIGHT: 195 LBS | HEIGHT: 68 IN

## 2024-07-10 DIAGNOSIS — G31.84 MILD COGNITIVE IMPAIRMENT, SO STATED: ICD-10-CM

## 2024-07-10 DIAGNOSIS — R90.82 WHITE MATTER DISEASE, UNSPECIFIED: ICD-10-CM

## 2024-07-10 DIAGNOSIS — R51.9 HEADACHE, UNSPECIFIED: ICD-10-CM

## 2024-07-10 PROCEDURE — 99214 OFFICE O/P EST MOD 30 MIN: CPT

## 2024-07-10 PROCEDURE — G2211 COMPLEX E/M VISIT ADD ON: CPT

## 2024-07-10 RX ORDER — UBROGEPANT 50 MG/1
50 TABLET ORAL
Qty: 1 | Refills: 11 | Status: ACTIVE | COMMUNITY
Start: 2024-07-10 | End: 1900-01-01

## 2024-07-12 ENCOUNTER — APPOINTMENT (OUTPATIENT)
Dept: INTERNAL MEDICINE | Facility: CLINIC | Age: 79
End: 2024-07-12
Payer: MEDICARE

## 2024-07-12 ENCOUNTER — LABORATORY RESULT (OUTPATIENT)
Age: 79
End: 2024-07-12

## 2024-07-12 VITALS
HEIGHT: 68 IN | DIASTOLIC BLOOD PRESSURE: 76 MMHG | SYSTOLIC BLOOD PRESSURE: 135 MMHG | TEMPERATURE: 98 F | OXYGEN SATURATION: 95 % | BODY MASS INDEX: 30.09 KG/M2 | WEIGHT: 198.56 LBS | HEART RATE: 77 BPM

## 2024-07-12 DIAGNOSIS — J45.909 UNSPECIFIED ASTHMA, UNCOMPLICATED: ICD-10-CM

## 2024-07-12 DIAGNOSIS — I25.10 ATHEROSCLEROTIC HEART DISEASE OF NATIVE CORONARY ARTERY W/OUT ANGINA PECTORIS: ICD-10-CM

## 2024-07-12 DIAGNOSIS — R42 DIZZINESS AND GIDDINESS: ICD-10-CM

## 2024-07-12 DIAGNOSIS — I10 ESSENTIAL (PRIMARY) HYPERTENSION: ICD-10-CM

## 2024-07-12 DIAGNOSIS — N18.30 CHRONIC KIDNEY DISEASE, STAGE 3 UNSPECIFIED: ICD-10-CM

## 2024-07-12 DIAGNOSIS — J30.9 ALLERGIC RHINITIS, UNSPECIFIED: ICD-10-CM

## 2024-07-12 DIAGNOSIS — R60.0 LOCALIZED EDEMA: ICD-10-CM

## 2024-07-12 DIAGNOSIS — R06.09 OTHER FORMS OF DYSPNEA: ICD-10-CM

## 2024-07-12 DIAGNOSIS — E78.2 MIXED HYPERLIPIDEMIA: ICD-10-CM

## 2024-07-12 DIAGNOSIS — B07.9 VIRAL WART, UNSPECIFIED: ICD-10-CM

## 2024-07-12 DIAGNOSIS — R41.3 OTHER AMNESIA: ICD-10-CM

## 2024-07-12 PROCEDURE — 36415 COLL VENOUS BLD VENIPUNCTURE: CPT

## 2024-07-12 PROCEDURE — 99214 OFFICE O/P EST MOD 30 MIN: CPT | Mod: 25

## 2024-07-12 RX ORDER — CALCITRIOL 0.25 UG/1
0.25 CAPSULE, LIQUID FILLED ORAL
Refills: 0 | Status: ACTIVE | COMMUNITY
Start: 2024-02-16

## 2024-07-13 LAB
25(OH)D3 SERPL-MCNC: 40.9 NG/ML
ALBUMIN SERPL ELPH-MCNC: 4.4 G/DL
ALP BLD-CCNC: 61 U/L
ALT SERPL-CCNC: 17 U/L
ANION GAP SERPL CALC-SCNC: 12 MMOL/L
AST SERPL-CCNC: 19 U/L
BASOPHILS # BLD AUTO: 0.07 K/UL
BASOPHILS NFR BLD AUTO: 0.9 %
BILIRUB SERPL-MCNC: 0.4 MG/DL
BUN SERPL-MCNC: 41 MG/DL
CALCIUM SERPL-MCNC: 9.4 MG/DL
CHLORIDE SERPL-SCNC: 106 MMOL/L
CHOLEST SERPL-MCNC: 141 MG/DL
CO2 SERPL-SCNC: 24 MMOL/L
CREAT SERPL-MCNC: 2.55 MG/DL
EGFR: 25 ML/MIN/1.73M2
EOSINOPHIL # BLD AUTO: 0.38 K/UL
EOSINOPHIL NFR BLD AUTO: 4.7 %
ESTIMATED AVERAGE GLUCOSE: 114 MG/DL
GLUCOSE SERPL-MCNC: 88 MG/DL
HBA1C MFR BLD HPLC: 5.6 %
HCT VFR BLD CALC: NORMAL
HDLC SERPL-MCNC: 56 MG/DL
HGB BLD-MCNC: 11.5 G/DL
IMM GRANULOCYTES NFR BLD AUTO: 2.1 %
IRON SATN MFR SERPL: 30 %
IRON SERPL-MCNC: 94 UG/DL
LDLC SERPL CALC-MCNC: 70 MG/DL
LYMPHOCYTES # BLD AUTO: 1.2 K/UL
LYMPHOCYTES NFR BLD AUTO: 14.8 %
MAGNESIUM SERPL-MCNC: 2.3 MG/DL
MAN DIFF?: NORMAL
MCHC RBC-ENTMCNC: NORMAL
MCHC RBC-ENTMCNC: NORMAL
MCV RBC AUTO: NORMAL
MONOCYTES # BLD AUTO: 1.03 K/UL
MONOCYTES NFR BLD AUTO: 12.7 %
NEUTROPHILS # BLD AUTO: 5.26 K/UL
NEUTROPHILS NFR BLD AUTO: 64.8 %
NONHDLC SERPL-MCNC: 85 MG/DL
PLATELET # BLD AUTO: 274 K/UL
PROT SERPL-MCNC: 6.7 G/DL
RBC # FLD: NORMAL
SODIUM SERPL-SCNC: 142 MMOL/L
TRIGL SERPL-MCNC: 73 MG/DL
UIBC SERPL-MCNC: 215 UG/DL
VIT B12 SERPL-MCNC: 1217 PG/ML
WBC # FLD AUTO: 8.11 K/UL

## 2024-07-15 ENCOUNTER — APPOINTMENT (OUTPATIENT)
Dept: CARDIOLOGY | Facility: CLINIC | Age: 79
End: 2024-07-15

## 2024-08-01 ENCOUNTER — APPOINTMENT (OUTPATIENT)
Dept: NEUROLOGY | Facility: CLINIC | Age: 79
End: 2024-08-01
Payer: MEDICARE

## 2024-08-01 VITALS
DIASTOLIC BLOOD PRESSURE: 82 MMHG | WEIGHT: 200 LBS | SYSTOLIC BLOOD PRESSURE: 133 MMHG | BODY MASS INDEX: 30.31 KG/M2 | HEIGHT: 68 IN | HEART RATE: 72 BPM | OXYGEN SATURATION: 98 %

## 2024-08-01 DIAGNOSIS — I67.1 CEREBRAL ANEURYSM, NONRUPTURED: ICD-10-CM

## 2024-08-01 PROCEDURE — 99215 OFFICE O/P EST HI 40 MIN: CPT

## 2024-08-01 RX ORDER — ASPIRIN 81 MG/1
TABLET, DELAYED RELEASE ORAL
Refills: 0 | Status: ACTIVE | COMMUNITY

## 2024-08-01 NOTE — PHYSICAL EXAM
[FreeTextEntry1] : GENERAL PHYSICAL EXAM: GEN: no distress, normal affect EYES: sclera white, conjunctiva clear, no nystagmus CV: normal rhythm PULM: no respiratory distress, normal rhythm and effort EXT: no edema, no cyanosis MSK: muscle tone and strength normal SKIN: warm, dry, no rash or lesion on exposed skin   NEUROLOGICAL EXAM: Mental Status Orientation: alert and oriented to person, place, time, and situation Language: clear and fluent, intact comprehension and repetition  Cranial Nerves II: visual fields full to confrontation  III, IV, VI: PERRL, EOMI V, VII: facial sensation and movement intact and symmetric  VIII: hearing intact  IX, X: uvula midline, soft palate elevates normally  XI: BL shoulder shrug intact  XII: tongue midline  Motor Shoulder abd: 5 (R), 5 (L) EF/EE: 5 (R), 5 (L) WF/WE: 5 (R), 5 (L) hand : 5 (R), 5 (L) HF/HE: 5 (R), 5 (L) KF/KE: 5 (R), 5 (L) DF/PF: 5 (R), 5 (L)  Tone and bulk are normal in upper and lower limbs No pronator drift  Sensation Intact to light touch in all 4 EXTs  Reflex 2+ in BL biceps, brachioradialis, patella  Coordination Normal FTN bilaterally Able to perform rapid, alternating movements  Gait Normal stance, stride, and pivot turn Tandem walk intact Negative Romberg

## 2024-08-01 NOTE — DISCUSSION/SUMMARY
[FreeTextEntry1] : Patient is a 78 year-old man with PMH CKD, HLD, dizziness who was referred to me by Dr. Muniz for cerebral aneurysm. CTA head demonstrated questionable tiny 1-2 mm right Pcomm infundibulum vs. saccular aneurysm, severe atherosclerotic narrowing of the supraclinoid ICA segments bilaterally. Irregularity with moderate focal narrowing of the left P2 PCA. CTA neck with atherosclerotic changes at both carotid bifurcations with less than 50% stenosis at the ICA origin/proximal segments bilaterally. We discussed the risks of having an aneurysm and the chance of an aneurysm causing a SAH. Based on patient's age and size and location of possible aneurysm, I believe its lifetime risk of rupture is extremely low and risks of treatment would surely outweighs any benefits. Will repeat MRA head in one year. If possible aneurysm has not increased in size, no further imaging is indicated. He will follow-up with me after imaging next year. All of his questions and concerns were addressed.

## 2024-08-01 NOTE — HISTORY OF PRESENT ILLNESS
[FreeTextEntry1] : Patient is a 78 year-old man with PMH CKD, HLD, dizziness who was referred to me by Dr. Muniz for cerebral aneurysm. He was evaluated in the hospital for dizziness on 4/25/26, which was likely peripheral in etiology. Neuro imaging was performed. CTH negative. CTA head demonstrated questionable tiny 1-2 mm right Pcomm infundibulum vs. saccular aneurysm, severe atherosclerotic narrowing of the supraclinoid ICA segments bilaterally. Irregularity with moderate focal narrowing of the left P2 PCA. CTH negative. CTA neck with atherosclerotic changes at both carotid bifurcations with less than 50% stenosis at the ICA origin/proximal segments bilaterally and MRI brain with no acute intracranial hemorrhage, acute ischemia, or abnormal intracranial enhancement, multiple extensive patchy confluent nonspecific abnormal white matter foci of T2/FLAIR prolongation statistically favoring microvascular type changes. 1 cm left lateral posterior fossa meningioma which is suboptimally evaluated on this study. His vertigo symptoms are improving with balance, vestibular therapy. He denies TIA or stroke-like symptoms.

## 2024-09-04 ENCOUNTER — RX RENEWAL (OUTPATIENT)
Age: 79
End: 2024-09-04

## 2024-09-12 ENCOUNTER — APPOINTMENT (OUTPATIENT)
Dept: PULMONOLOGY | Facility: CLINIC | Age: 79
End: 2024-09-12
Payer: MEDICARE

## 2024-09-12 VITALS
HEART RATE: 61 BPM | WEIGHT: 200 LBS | OXYGEN SATURATION: 97 % | SYSTOLIC BLOOD PRESSURE: 118 MMHG | DIASTOLIC BLOOD PRESSURE: 73 MMHG | HEIGHT: 68 IN | BODY MASS INDEX: 30.31 KG/M2

## 2024-09-12 PROCEDURE — 99214 OFFICE O/P EST MOD 30 MIN: CPT

## 2024-09-12 NOTE — ASSESSMENT
[FreeTextEntry1] : 78-year-old never smoker with moderate persistent asthma here for follow-up  #Asthma - Currently doing well on Breo 100 Pulmonary function testing Oct 2023 w no evidence of obstruction, moderate restriction, normal DLCO.  Suggestion of bronchodilator response not meeting ATS criteria.  (5/2023) -> -> 470 (7/2024).  Chest x-ray In June 2023 with clear lungs -- c/w Breo 100 -- Continue Singulair -- Repeat PFTs  #HCM: Immunizations: Recommend flu shot later this fall.  pneumococcal vaccine approx 2019  All questions answered. Patient in agreement with plan.  Follow up in 6mo or sooner if needed.

## 2024-09-12 NOTE — CONSULT LETTER
[Dear  ___] : Dear  [unfilled], [Consult Letter:] : I had the pleasure of evaluating your patient, [unfilled]. [Please see my note below.] : Please see my note below. [Consult Closing:] : Thank you very much for allowing me to participate in the care of this patient.  If you have any questions, please do not hesitate to contact me. [FreeTextEntry3] : Sincerely,  Rosa Ho MD [facility] tel: [phone] fax: [phone]

## 2024-09-12 NOTE — HISTORY OF PRESENT ILLNESS
[Never] : never [TextBox_4] : Mr. VANGIE LOERA is a 78 year old man never smoker w asthma is here for f/u.  History: Developed acute bronchitis April/May 2023. Denies respiratory issues prior to this.  Patient reports a 3 months history of wheezing - dry coughing - treated with antibiotics and albuterol - he improved and then two weeks later his symptoms recurred and he was treated with prednisone with improvement and then recurrence and on his last visit he was then treated with Symbicort 80 BID and he improved.  Was seen by Dr Boxer - dose of Symbicort was increased. Today, he reports persistent MOJICA when walking up the stairs. He is no longer wheezing/coughing. He has been noticing SOB for the last 1-2 months  Interval Events: Has been doing really well on Breo 100 and Singulair reports compliance.  Feels well.  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  FH: father  of lung ca at 77 (smoker) PMD: ALENA DAWKINS

## 2024-09-25 ENCOUNTER — APPOINTMENT (OUTPATIENT)
Dept: NEUROLOGY | Facility: CLINIC | Age: 79
End: 2024-09-25
Payer: MEDICARE

## 2024-09-25 VITALS
HEART RATE: 96 BPM | BODY MASS INDEX: 28.79 KG/M2 | DIASTOLIC BLOOD PRESSURE: 70 MMHG | HEIGHT: 68 IN | OXYGEN SATURATION: 98 % | SYSTOLIC BLOOD PRESSURE: 110 MMHG | WEIGHT: 190 LBS

## 2024-09-25 DIAGNOSIS — R51.9 HEADACHE, UNSPECIFIED: ICD-10-CM

## 2024-09-25 DIAGNOSIS — R42 DIZZINESS AND GIDDINESS: ICD-10-CM

## 2024-09-25 DIAGNOSIS — I67.1 CEREBRAL ANEURYSM, NONRUPTURED: ICD-10-CM

## 2024-09-25 PROCEDURE — 99214 OFFICE O/P EST MOD 30 MIN: CPT

## 2024-09-25 PROCEDURE — G2211 COMPLEX E/M VISIT ADD ON: CPT

## 2024-09-25 NOTE — DATA REVIEWED
[de-identified] : MR brain and IAC w/o contrast 6/6/24 (personally reviewed)-: 1 cm extra axial mass in left lateral posterior fossa adjacent to cerebellar hemisphere, likely meningioma (this was also seen on CTH 4/2024)- this is not causing any mass effect on cerebellum. No cerebellar signal abnormality.  extensive chronic microvascular ischemic changes. no IAC abnormality. [de-identified] : CTH and CTA H/N 4/2024- CT HEAD: No acute intracranial hemorrhage or mass effect. Chronic changes-volume loss and microvascular ischemic changes. CTA HEAD: No large vessel occlusion. Severe atherosclerotic narrowing of the supraclinoid ICA segments bilaterally. Irregularity with moderate focal narrowing of the left P2 PCA. Question tiny 1-2 mm right PCOM origin infundibulum versus saccular aneurysm. CTA NECK: Atherosclerotic changes at both carotid bifurcations with less than 50% stenosis at the ICA origin/proximal segments bilaterally.

## 2024-09-25 NOTE — HISTORY OF PRESENT ILLNESS
[FreeTextEntry1] : INTERIM HX 09/25/2024: Seen by Dr Staton for possible Pcomm aneurysm, recommend follow up in 1 year with MRA head.  He started vestibular therapy and feels a lot better. Dizzy spells still come on when he moves about too fast or looks down. Headaches are infrequent, "it is more of a head fuzziness".  Never tried ubrelvy.   INTERIM HX 07/10/2024: Per card, dizziness less likely primary cardiac etiology.  VNG 6/28/24: suggestive of central vs ocular pathology.  He has been magnesium 400 mg. Headaches and vertigo better. 50% better per patient.  He tried nurtec made him dizzy. He just started vestibular therapy last week.  INTERIM HX 06/12/2024: MR brain and IAC w/o contrast 6/6/24 (personally reviewed)-: 1 cm extra axial mass in left lateral posterior fossa adjacent to cerebellar hemisphere, likely meningioma (this was also seen on CTH 4/2024)- this is not causing any mass effect on cerebellum. No cerebellar signal abnormality.  extensive chronic microvascular ischemic changes. no IAC abnormality. Pt here with partner. He is stable. Continues to get the dizzy episodes followed by R facial tingling and right sided headaches. Can occur a few times in a day. The dizzy spells only occur when standing.  Of note, he has been seen by a neurologist 8-10 years ago and was diagnosed with vascular cognitive impairment and was started on aricept and memantine- pt stopped these. Overall stable short term memory loss.   HPI (initial visit Jun 05, 2024)- VANGIE LOERA is a 78 year old man w/ HTN, Asthma, CKD, referred for dizziness.   Pt seen by cardiology and ENT.  Dizziness since April 2024.  "Like rocking on a boat". No room spinning sensation. Associated with flushing/tingling of right side of face and right sided headache that lasts < 1 hour- occasionally needs to take Tylenol (HA does not occur every time). + nausea w/ headaches. No prior hx of headaches.  He stopped metoprolol but this did not help with dizziness. ` No facial droop, vision changes, speech changes, extremity weakness/numbness. No redness of eye or nasal congestion.  Chronic eye tearing b/l from allergies.  Dizziness occurs when standing and looking down. Lasts < 1 min. Happens when getting up too fast. No syncopal events. Had 1 fall- loss his balance.  Seen in ED 4/2024.  Trialed meclizine- did not help. Occasional tinnitus in right ear (new since 4/2024). No ear fullness. Wears hearing aids.  Cards work up unrevealing. Had Zio monitor with sinus rhythm during triggered events. Per cards, less likely primary cardiac etiology.  No chest pain or SOB. NO palpitations.  VNG/ENG pending.    CTH and CTA H/N 4/2024- CT HEAD: No acute intracranial hemorrhage or mass effect. Chronic changes-volume loss and microvascular ischemic changes. CTA HEAD: No large vessel occlusion. Severe atherosclerotic narrowing of the supraclinoid ICA segments bilaterally. Irregularity with moderate focal narrowing of the left P2 PCA. Question tiny 1-2 mm right PCOM origin infundibulum versus saccular aneurysm. CTA NECK: Atherosclerotic changes at both carotid bifurcations with less than 50% stenosis at the ICA origin/proximal segments bilaterally.

## 2024-09-25 NOTE — ASSESSMENT
[FreeTextEntry1] : Assessment/Plan:  79 year old male w/ new onset episodic vertigo since 4/2024-  # Vertigo: unclear etiology of symptoms. The episodic, brief and positional features of the symptoms seem most c/w BPPV, however the accompanied right sided headaches and facial flushing/tingling would be atypical for BPPV. Presentation less likely ischemic in etiology. The ? PCOM aneurysm and focal narrowing of left P2 PCA are likely non specific and incidental findings. Other peripheral vestibular disorders, like Meniere disease, could still be on the differential. MRI brain/IAC with incidental L lateral posterior fossa extra axial mass (1 cm)- likely meningioma (benign, likely incidental finding).  # Right sided headache with facial flushing/tingling: Headaches are proceeded by brief vertigo and R facial tingling, ? Migraine headaches with aura (? brainstem aura).  # Vascular cognitive impairment (stable): MRI brain w/ extensive WM disease.  -- Headaches and vertigo continue to improve. He has noted the most improvement in vertigo since starting vestibular therapy --   Plan:- - Continue vestibular therapy - Recommend Magnesium 400 mg QD - Dc Nurtec - pt could not tolerate due to worsening dizziness. Can trial Ubrelvy PRN instead. (triptan contraindicated due to ? cerebral aneurysm, hx of HTN and ? brainstem aura) - Seen by Dr Staton for possible right Pcomm aneurysm vs infundibulum, recommend follow up in 1 year with MRA head.  - Continue to follow with ENT and Cardiology - Avoid standing up too fast or making rapid head movements  Return to clinic 6 months  Nephrologist: Dr Mohsen Pahlavan, MD  The above plan was discussed with VANGIE LOERA in great detail. VANGIE LOERA verbalized understanding and agrees with plan as detailed above. He was advised to call our clinic at [phone] for any new or worsening symptoms, or with any questions or concerns. All his questions/concerns were addressed.  Shanel Muniz M.D.

## 2024-10-08 ENCOUNTER — APPOINTMENT (OUTPATIENT)
Dept: ALLERGY | Facility: CLINIC | Age: 79
End: 2024-10-08
Payer: MEDICARE

## 2024-10-08 ENCOUNTER — NON-APPOINTMENT (OUTPATIENT)
Age: 79
End: 2024-10-08

## 2024-10-08 VITALS
OXYGEN SATURATION: 97 % | DIASTOLIC BLOOD PRESSURE: 76 MMHG | HEIGHT: 68 IN | BODY MASS INDEX: 28.49 KG/M2 | WEIGHT: 188 LBS | HEART RATE: 69 BPM | SYSTOLIC BLOOD PRESSURE: 133 MMHG

## 2024-10-08 PROCEDURE — 94060 EVALUATION OF WHEEZING: CPT

## 2024-10-08 PROCEDURE — 99214 OFFICE O/P EST MOD 30 MIN: CPT | Mod: 25

## 2024-11-15 ENCOUNTER — APPOINTMENT (OUTPATIENT)
Dept: INTERNAL MEDICINE | Facility: CLINIC | Age: 79
End: 2024-11-15

## 2024-11-18 ENCOUNTER — APPOINTMENT (OUTPATIENT)
Dept: INTERNAL MEDICINE | Facility: CLINIC | Age: 79
End: 2024-11-18

## 2024-11-18 ENCOUNTER — NON-APPOINTMENT (OUTPATIENT)
Age: 79
End: 2024-11-18

## 2024-11-18 VITALS
OXYGEN SATURATION: 93 % | WEIGHT: 184 LBS | TEMPERATURE: 98 F | HEART RATE: 73 BPM | RESPIRATION RATE: 16 BRPM | BODY MASS INDEX: 27.89 KG/M2 | SYSTOLIC BLOOD PRESSURE: 118 MMHG | DIASTOLIC BLOOD PRESSURE: 65 MMHG | HEIGHT: 68 IN

## 2024-11-18 DIAGNOSIS — E78.2 MIXED HYPERLIPIDEMIA: ICD-10-CM

## 2024-11-18 DIAGNOSIS — R60.0 LOCALIZED EDEMA: ICD-10-CM

## 2024-11-18 DIAGNOSIS — N13.8 BENIGN PROSTATIC HYPERPLASIA WITH LOWER URINARY TRACT SYMPMS: ICD-10-CM

## 2024-11-18 DIAGNOSIS — E66.811 OBESITY, CLASS 1: ICD-10-CM

## 2024-11-18 DIAGNOSIS — I10 ESSENTIAL (PRIMARY) HYPERTENSION: ICD-10-CM

## 2024-11-18 DIAGNOSIS — N40.1 BENIGN PROSTATIC HYPERPLASIA WITH LOWER URINARY TRACT SYMPMS: ICD-10-CM

## 2024-11-18 DIAGNOSIS — N18.30 CHRONIC KIDNEY DISEASE, STAGE 3 UNSPECIFIED: ICD-10-CM

## 2024-11-18 DIAGNOSIS — R51.9 HEADACHE, UNSPECIFIED: ICD-10-CM

## 2024-11-18 DIAGNOSIS — Z00.00 ENCOUNTER FOR GENERAL ADULT MEDICAL EXAMINATION W/OUT ABNORMAL FINDINGS: ICD-10-CM

## 2024-11-18 DIAGNOSIS — R42 DIZZINESS AND GIDDINESS: ICD-10-CM

## 2024-11-18 DIAGNOSIS — I25.10 ATHEROSCLEROTIC HEART DISEASE OF NATIVE CORONARY ARTERY W/OUT ANGINA PECTORIS: ICD-10-CM

## 2024-11-18 DIAGNOSIS — G31.84 MILD COGNITIVE IMPAIRMENT, SO STATED: ICD-10-CM

## 2024-11-18 PROCEDURE — G0439: CPT

## 2024-11-18 PROCEDURE — 36415 COLL VENOUS BLD VENIPUNCTURE: CPT

## 2024-11-18 PROCEDURE — 99214 OFFICE O/P EST MOD 30 MIN: CPT | Mod: 25

## 2024-11-18 RX ORDER — CALCIUM ACETATE 667 MG/1
667 CAPSULE ORAL
Refills: 0 | Status: ACTIVE | COMMUNITY
Start: 2024-08-20

## 2024-11-19 LAB
ESTIMATED AVERAGE GLUCOSE: 108 MG/DL
HBA1C MFR BLD HPLC: 5.4 %
PSA FREE FLD-MCNC: 54 %
PSA FREE SERPL-MCNC: 0.77 NG/ML
PSA SERPL-MCNC: 1.44 NG/ML

## 2024-12-04 ENCOUNTER — RX RENEWAL (OUTPATIENT)
Age: 79
End: 2024-12-04

## 2024-12-30 ENCOUNTER — APPOINTMENT (OUTPATIENT)
Dept: CARDIOLOGY | Facility: CLINIC | Age: 79
End: 2024-12-30
Payer: MEDICARE

## 2024-12-30 ENCOUNTER — NON-APPOINTMENT (OUTPATIENT)
Age: 79
End: 2024-12-30

## 2024-12-30 VITALS
SYSTOLIC BLOOD PRESSURE: 128 MMHG | TEMPERATURE: 98.4 F | HEART RATE: 63 BPM | OXYGEN SATURATION: 96 % | DIASTOLIC BLOOD PRESSURE: 72 MMHG | RESPIRATION RATE: 17 BRPM | WEIGHT: 189 LBS | BODY MASS INDEX: 28.64 KG/M2 | HEIGHT: 68 IN

## 2024-12-30 DIAGNOSIS — I25.10 ATHEROSCLEROTIC HEART DISEASE OF NATIVE CORONARY ARTERY W/OUT ANGINA PECTORIS: ICD-10-CM

## 2024-12-30 DIAGNOSIS — J45.909 UNSPECIFIED ASTHMA, UNCOMPLICATED: ICD-10-CM

## 2024-12-30 DIAGNOSIS — E78.2 MIXED HYPERLIPIDEMIA: ICD-10-CM

## 2024-12-30 PROCEDURE — G2211 COMPLEX E/M VISIT ADD ON: CPT

## 2024-12-30 PROCEDURE — 99215 OFFICE O/P EST HI 40 MIN: CPT

## 2024-12-30 PROCEDURE — 93000 ELECTROCARDIOGRAM COMPLETE: CPT

## 2025-03-03 ENCOUNTER — RX RENEWAL (OUTPATIENT)
Age: 80
End: 2025-03-03

## 2025-03-17 ENCOUNTER — APPOINTMENT (OUTPATIENT)
Dept: PULMONOLOGY | Facility: CLINIC | Age: 80
End: 2025-03-17
Payer: MEDICARE

## 2025-03-17 VITALS
BODY MASS INDEX: 27.36 KG/M2 | SYSTOLIC BLOOD PRESSURE: 116 MMHG | OXYGEN SATURATION: 98 % | RESPIRATION RATE: 17 BRPM | TEMPERATURE: 97.9 F | DIASTOLIC BLOOD PRESSURE: 64 MMHG | HEIGHT: 68 IN | WEIGHT: 180.56 LBS | HEART RATE: 69 BPM

## 2025-03-17 DIAGNOSIS — J45.909 UNSPECIFIED ASTHMA, UNCOMPLICATED: ICD-10-CM

## 2025-03-17 PROCEDURE — 94729 DIFFUSING CAPACITY: CPT

## 2025-03-17 PROCEDURE — 94060 EVALUATION OF WHEEZING: CPT

## 2025-03-17 PROCEDURE — 94727 GAS DIL/WSHOT DETER LNG VOL: CPT

## 2025-03-17 PROCEDURE — 99214 OFFICE O/P EST MOD 30 MIN: CPT | Mod: 25

## 2025-03-17 RX ORDER — ALBUTEROL SULFATE AND BUDESONIDE 90; 80 UG/1; UG/1
90-80 AEROSOL, METERED RESPIRATORY (INHALATION)
Qty: 1 | Refills: 3 | Status: ACTIVE | COMMUNITY
Start: 2025-03-17 | End: 1900-01-01

## 2025-03-26 ENCOUNTER — APPOINTMENT (OUTPATIENT)
Dept: NEUROLOGY | Facility: CLINIC | Age: 80
End: 2025-03-26

## 2025-05-05 ENCOUNTER — APPOINTMENT (OUTPATIENT)
Dept: INTERNAL MEDICINE | Facility: CLINIC | Age: 80
End: 2025-05-05

## 2025-05-07 ENCOUNTER — APPOINTMENT (OUTPATIENT)
Dept: INTERNAL MEDICINE | Facility: CLINIC | Age: 80
End: 2025-05-07
Payer: MEDICARE

## 2025-05-07 VITALS
HEIGHT: 68 IN | BODY MASS INDEX: 28.04 KG/M2 | RESPIRATION RATE: 16 BRPM | OXYGEN SATURATION: 95 % | SYSTOLIC BLOOD PRESSURE: 125 MMHG | TEMPERATURE: 98.3 F | HEART RATE: 72 BPM | WEIGHT: 185 LBS | DIASTOLIC BLOOD PRESSURE: 69 MMHG

## 2025-05-07 DIAGNOSIS — R35.1 NOCTURIA: ICD-10-CM

## 2025-05-07 DIAGNOSIS — N18.30 CHRONIC KIDNEY DISEASE, STAGE 3 UNSPECIFIED: ICD-10-CM

## 2025-05-07 DIAGNOSIS — N40.1 BENIGN PROSTATIC HYPERPLASIA WITH LOWER URINARY TRACT SYMPMS: ICD-10-CM

## 2025-05-07 DIAGNOSIS — R42 DIZZINESS AND GIDDINESS: ICD-10-CM

## 2025-05-07 DIAGNOSIS — N13.8 BENIGN PROSTATIC HYPERPLASIA WITH LOWER URINARY TRACT SYMPMS: ICD-10-CM

## 2025-05-07 DIAGNOSIS — E78.2 MIXED HYPERLIPIDEMIA: ICD-10-CM

## 2025-05-07 DIAGNOSIS — I25.10 ATHEROSCLEROTIC HEART DISEASE OF NATIVE CORONARY ARTERY W/OUT ANGINA PECTORIS: ICD-10-CM

## 2025-05-07 PROCEDURE — G2211 COMPLEX E/M VISIT ADD ON: CPT

## 2025-05-07 PROCEDURE — 99204 OFFICE O/P NEW MOD 45 MIN: CPT

## 2025-05-19 ENCOUNTER — APPOINTMENT (OUTPATIENT)
Dept: UROLOGY | Facility: CLINIC | Age: 80
End: 2025-05-19
Payer: MEDICARE

## 2025-05-19 VITALS
BODY MASS INDEX: 28.04 KG/M2 | DIASTOLIC BLOOD PRESSURE: 66 MMHG | HEART RATE: 69 BPM | WEIGHT: 185 LBS | SYSTOLIC BLOOD PRESSURE: 109 MMHG | TEMPERATURE: 98.4 F | OXYGEN SATURATION: 96 % | HEIGHT: 68 IN

## 2025-05-19 DIAGNOSIS — N13.8 BENIGN PROSTATIC HYPERPLASIA WITH LOWER URINARY TRACT SYMPMS: ICD-10-CM

## 2025-05-19 DIAGNOSIS — N40.1 BENIGN PROSTATIC HYPERPLASIA WITH LOWER URINARY TRACT SYMPMS: ICD-10-CM

## 2025-05-19 PROCEDURE — 99203 OFFICE O/P NEW LOW 30 MIN: CPT

## 2025-05-20 LAB
ALBUMIN SERPL ELPH-MCNC: 4.3 G/DL
ANION GAP SERPL CALC-SCNC: 14 MMOL/L
APPEARANCE: ABNORMAL
BACTERIA: NEGATIVE /HPF
BILIRUBIN URINE: NEGATIVE
BLOOD URINE: NEGATIVE
BUN SERPL-MCNC: 47 MG/DL
CALCIUM SERPL-MCNC: 9.1 MG/DL
CAST: 0 /LPF
CHLORIDE SERPL-SCNC: 109 MMOL/L
CO2 SERPL-SCNC: 20 MMOL/L
COLOR: YELLOW
CREAT SERPL-MCNC: 2.6 MG/DL
EGFRCR SERPLBLD CKD-EPI 2021: 25 ML/MIN/1.73M2
EPITHELIAL CELLS: 0 /HPF
GLUCOSE QUALITATIVE U: NEGATIVE MG/DL
GLUCOSE SERPL-MCNC: 66 MG/DL
KETONES URINE: NEGATIVE MG/DL
LEUKOCYTE ESTERASE URINE: NEGATIVE
MICROSCOPIC-UA: NORMAL
NITRITE URINE: NEGATIVE
PH URINE: 5.5
PHOSPHATE SERPL-MCNC: 4.3 MG/DL
POTASSIUM SERPL-SCNC: 5.3 MMOL/L
PROTEIN URINE: 100 MG/DL
PSA SERPL-MCNC: 0.76 NG/ML
RED BLOOD CELLS URINE: 0 /HPF
SODIUM SERPL-SCNC: 143 MMOL/L
SPECIFIC GRAVITY URINE: 1.02
UROBILINOGEN URINE: 0.2 MG/DL
WHITE BLOOD CELLS URINE: 1 /HPF

## 2025-05-22 LAB — BACTERIA UR CULT: NORMAL

## 2025-05-27 ENCOUNTER — APPOINTMENT (OUTPATIENT)
Dept: UROLOGY | Facility: CLINIC | Age: 80
End: 2025-05-27
Payer: COMMERCIAL

## 2025-05-27 VITALS
OXYGEN SATURATION: 96 % | WEIGHT: 185 LBS | BODY MASS INDEX: 28.13 KG/M2 | SYSTOLIC BLOOD PRESSURE: 116 MMHG | DIASTOLIC BLOOD PRESSURE: 67 MMHG | TEMPERATURE: 98.4 F | HEART RATE: 68 BPM

## 2025-05-27 DIAGNOSIS — R39.9 UNSPECIFIED SYMPTOMS AND SIGNS INVOLVING THE GENITOURINARY SYSTEM: ICD-10-CM

## 2025-05-27 PROCEDURE — 99203 OFFICE O/P NEW LOW 30 MIN: CPT

## 2025-05-27 PROCEDURE — 99213 OFFICE O/P EST LOW 20 MIN: CPT

## 2025-05-27 PROCEDURE — 76872 US TRANSRECTAL: CPT

## 2025-05-27 RX ORDER — TAMSULOSIN HYDROCHLORIDE 0.4 MG/1
0.4 CAPSULE ORAL DAILY
Qty: 180 | Refills: 3 | Status: ACTIVE | COMMUNITY
Start: 2025-05-27 | End: 1900-01-01

## 2025-06-09 ENCOUNTER — RX RENEWAL (OUTPATIENT)
Age: 80
End: 2025-06-09

## 2025-06-12 ENCOUNTER — APPOINTMENT (OUTPATIENT)
Dept: PULMONOLOGY | Facility: CLINIC | Age: 80
End: 2025-06-12
Payer: COMMERCIAL

## 2025-06-12 PROCEDURE — 94729 DIFFUSING CAPACITY: CPT

## 2025-06-12 PROCEDURE — 94060 EVALUATION OF WHEEZING: CPT

## 2025-06-12 PROCEDURE — 94727 GAS DIL/WSHOT DETER LNG VOL: CPT

## 2025-06-17 ENCOUNTER — APPOINTMENT (OUTPATIENT)
Dept: PULMONOLOGY | Facility: CLINIC | Age: 80
End: 2025-06-17
Payer: MEDICARE

## 2025-06-17 VITALS
HEIGHT: 68 IN | RESPIRATION RATE: 16 BRPM | DIASTOLIC BLOOD PRESSURE: 62 MMHG | SYSTOLIC BLOOD PRESSURE: 101 MMHG | HEART RATE: 68 BPM | BODY MASS INDEX: 28.34 KG/M2 | OXYGEN SATURATION: 96 % | WEIGHT: 187 LBS

## 2025-06-17 PROCEDURE — 99214 OFFICE O/P EST MOD 30 MIN: CPT

## 2025-06-17 RX ORDER — FLUTICASONE FUROATE AND VILANTEROL TRIFENATATE 100; 25 UG/1; UG/1
100-25 POWDER RESPIRATORY (INHALATION)
Qty: 1 | Refills: 3 | Status: ACTIVE | COMMUNITY
Start: 2025-06-17 | End: 1900-01-01

## 2025-06-18 ENCOUNTER — APPOINTMENT (OUTPATIENT)
Dept: CARDIOLOGY | Facility: CLINIC | Age: 80
End: 2025-06-18
Payer: MEDICARE

## 2025-06-18 VITALS
OXYGEN SATURATION: 94 % | HEIGHT: 68 IN | BODY MASS INDEX: 28.34 KG/M2 | DIASTOLIC BLOOD PRESSURE: 70 MMHG | HEART RATE: 65 BPM | TEMPERATURE: 98.4 F | SYSTOLIC BLOOD PRESSURE: 114 MMHG | RESPIRATION RATE: 16 BRPM | WEIGHT: 187 LBS

## 2025-06-18 PROCEDURE — 93000 ELECTROCARDIOGRAM COMPLETE: CPT

## 2025-06-18 PROCEDURE — 99215 OFFICE O/P EST HI 40 MIN: CPT

## 2025-06-19 ENCOUNTER — APPOINTMENT (OUTPATIENT)
Dept: UROLOGY | Facility: CLINIC | Age: 80
End: 2025-06-19
Payer: COMMERCIAL

## 2025-06-19 VITALS
HEART RATE: 57 BPM | WEIGHT: 187 LBS | BODY MASS INDEX: 28.34 KG/M2 | RESPIRATION RATE: 16 BRPM | OXYGEN SATURATION: 95 % | TEMPERATURE: 98.5 F | SYSTOLIC BLOOD PRESSURE: 144 MMHG | DIASTOLIC BLOOD PRESSURE: 75 MMHG | HEIGHT: 68 IN

## 2025-06-19 PROCEDURE — 51784 ANAL/URINARY MUSCLE STUDY: CPT

## 2025-06-19 PROCEDURE — 51741 ELECTRO-UROFLOWMETRY FIRST: CPT

## 2025-06-19 PROCEDURE — 51797 INTRAABDOMINAL PRESSURE TEST: CPT

## 2025-06-19 PROCEDURE — 51728 CYSTOMETROGRAM W/VP: CPT

## 2025-06-19 RX ORDER — MIRABEGRON 25 MG/1
25 TABLET, EXTENDED RELEASE ORAL
Qty: 30 | Refills: 6 | Status: ACTIVE | COMMUNITY
Start: 2025-06-19 | End: 1900-01-01

## 2025-06-25 ENCOUNTER — APPOINTMENT (OUTPATIENT)
Dept: CARDIOLOGY | Facility: CLINIC | Age: 80
End: 2025-06-25
Payer: MEDICARE

## 2025-06-25 PROCEDURE — 93306 TTE W/DOPPLER COMPLETE: CPT

## 2025-09-08 ENCOUNTER — RX RENEWAL (OUTPATIENT)
Age: 80
End: 2025-09-08

## 2025-09-10 ENCOUNTER — RX RENEWAL (OUTPATIENT)
Age: 80
End: 2025-09-10